# Patient Record
Sex: FEMALE | Race: OTHER | HISPANIC OR LATINO | Employment: PART TIME | URBAN - METROPOLITAN AREA
[De-identification: names, ages, dates, MRNs, and addresses within clinical notes are randomized per-mention and may not be internally consistent; named-entity substitution may affect disease eponyms.]

---

## 2017-01-12 ENCOUNTER — HOSPITAL ENCOUNTER (OUTPATIENT)
Facility: MEDICAL CENTER | Age: 32
End: 2017-01-12
Attending: PHYSICIAN ASSISTANT
Payer: MEDICAID

## 2017-01-12 ENCOUNTER — ROUTINE PRENATAL (OUTPATIENT)
Dept: OBGYN | Facility: CLINIC | Age: 32
End: 2017-01-12
Payer: MEDICAID

## 2017-01-12 VITALS — WEIGHT: 136 LBS | SYSTOLIC BLOOD PRESSURE: 104 MMHG | BODY MASS INDEX: 29.42 KG/M2 | DIASTOLIC BLOOD PRESSURE: 58 MMHG

## 2017-01-12 DIAGNOSIS — Z34.80 ENCOUNTER FOR SUPERVISION OF NORMAL PREGNANCY IN MULTIGRAVIDA, ANTEPARTUM: ICD-10-CM

## 2017-01-12 DIAGNOSIS — Z34.80 ENCOUNTER FOR SUPERVISION OF NORMAL PREGNANCY IN MULTIGRAVIDA, ANTEPARTUM: Primary | ICD-10-CM

## 2017-01-12 PROCEDURE — 90040 PR PRENATAL FOLLOW UP: CPT | Performed by: PHYSICIAN ASSISTANT

## 2017-01-12 PROCEDURE — 87653 STREP B DNA AMP PROBE: CPT

## 2017-01-12 NOTE — PROGRESS NOTES
Pt has no complaints with cramping, UCs, VB, LOF. +FM. GBS done today. Labor precautions reviewed. Daily FKC recommended. Cervix: Cl/75/-2, ballotable, post, firm, vtx. Pt does have cold, so cold remedies sheet given today - call if not improving. RTC 1 wk or sooner prn.

## 2017-01-12 NOTE — PROGRESS NOTES
Pt here today for OB follow up  Pt states no complaints  Reports +FM  WT: 136lb  BP:104/58  Good # [phone]  GBS TODAY

## 2017-01-12 NOTE — MR AVS SNAPSHOT
"Isabel Galarza   2017 3:30 PM   Routine Prenatal   MRN: [id number]    [facility]   Dept Phone:  [phone]    Description:  Female : 1985   Provider:  BLANCA Dahl           Allergies as of 2017     Allergen Noted Reactions    Sulfa Drugs 10/15/2013   Hives    \"when I was in long term\"  \"I looked like a lobster\"      You were diagnosed with     Encounter for supervision of normal pregnancy in multigravida, antepartum   [9493157]  -  Primary       Vital Signs     Blood Pressure Weight Last Menstrual Period Smoking Status          104/58 mmHg 61.689 kg (136 lb) (LMP Unknown) Never Smoker         Basic Information     Date Of Birth Sex Race Ethnicity Preferred Language    1985 Female  or   Origin (Tunisian,Palauan,Azerbaijani,Suhas, etc) English      Problem List              ICD-10-CM Priority Class Noted - Resolved    Encounter for supervision of normal pregnancy in multigravida, antepartum Z34.80   2016 - Present      Health Maintenance        Date Due Completion Dates    IMM DTaP/Tdap/Td Vaccine (1 - Tdap) 2005 ---    IMM INFLUENZA (1) 2016 ---    PAP SMEAR 2019, 2010            Current Immunizations     No immunizations on file.      Below and/or attached are the medications your provider expects you to take. Review all of your home medications and newly ordered medications with your provider and/or pharmacist. Follow medication instructions as directed by your provider and/or pharmacist. Please keep your medication list with you and share with your provider. Update the information when medications are discontinued, doses are changed, or new medications (including over-the-counter products) are added; and carry medication information at all times in the event of emergency situations     Allergies:  SULFA DRUGS - Hives               Medications  Valid as of: 2017 -  3:40 PM    Generic Name " Brand Name Tablet Size Instructions for use    Prenatal MV-Min-Fe Fum-FA-DHA   Take  by mouth.        .                 Medicines prescribed today were sent to:     [facility]/PHARMACY #[id number] - [place], [place] - [address]    [address] [place] [address]    Phone: [phone] Fax: [phone]    Open 24 Hours?: No      Medication refill instructions:       If your prescription bottle indicates you have medication refills left, it is not necessary to call your provider’s office. Please contact your pharmacy and they will refill your medication.    If your prescription bottle indicates you do not have any refills left, you may request refills at any time through one of the following ways: The online ExtraOrtho system (except Urgent Care), by calling your provider’s office, or by asking your pharmacy to contact your provider’s office with a refill request. Medication refills are processed only during regular business hours and may not be available until the next business day. Your provider may request additional information or to have a follow-up visit with you prior to refilling your medication.   *Please Note: Medication refills are assigned a new Rx number when refilled electronically. Your pharmacy may indicate that no refills were authorized even though a new prescription for the same medication is available at the pharmacy. Please request the medicine by name with the pharmacy before contacting your provider for a refill.        Your To Do List     Future Labs/Procedures Complete By Expires    GRP B STREP, BY PCR (GAGE BROTH)  As directed 1/12/2018    Comments:    SOURCE VAGINAL AND RECTAL      Other Notes About Your Plan     GIRL           MyChart Access Code: [email or web address]  Expires: 1/13/2017  4:34 PM    ExtraOrtho  A secure, online tool to manage your health information     [employer]’s ExtraOrtho® is a secure, online tool that connects you to your personalized health information from the  privacy of your home -- day or night - making it very easy for you to manage your healthcare. Once the activation process is completed, you can even access your medical information using the ShareSquare cordell, which is available for free in the Apple Cordell store or Google Play store.     ShareSquare provides the following levels of access (as shown below):   My Chart Features   [facility] Doctor [facility]  Specialists [facility]  Urgent  Care Non-[facility]  Primary Care  Doctor   Email your healthcare team securely and privately 24/7 X X X    Manage appointments: schedule your next appointment; view details of past/upcoming appointments X      Request prescription refills. X      View recent personal medical records, including lab and immunizations X X X X   View health record, including health history, allergies, medications X X X X   Read reports about your outpatient visits, procedures, consult and ER notes X X X X   See your discharge summary, which is a recap of your hospital and/or ER visit that includes your diagnosis, lab results, and care plan. X X       How to register for ShareSquare:  1. Go to  [email or web address].  2. Click on the Sign Up Now box, which takes you to the New Member Sign Up page. You will need to provide the following information:  a. Enter your ShareSquare Access Code exactly as it appears at the top of this page. (You will not need to use this code after you’ve completed the sign-up process. If you do not sign up before the expiration date, you must request a new code.)   b. Enter your date of birth.   c. Enter your home email address.   d. Click Submit, and follow the next screen’s instructions.  3. Create a ShareSquare ID. This will be your ShareSquare login ID and cannot be changed, so think of one that is secure and easy to remember.  4. Create a ShareSquare password. You can change your password at any time.  5. Enter your Password Reset Question and Answer. This can be used at a later time if you forget  your password.   6. Enter your e-mail address. This allows you to receive e-mail notifications when new information is available in Zappli.  7. Click Sign Up. You can now view your health information.    For assistance activating your Zappli account, call [phone]

## 2017-01-13 LAB — GP B STREP DNA SPEC QL NAA+PROBE: NEGATIVE

## 2017-01-23 ENCOUNTER — ROUTINE PRENATAL (OUTPATIENT)
Dept: OBGYN | Facility: CLINIC | Age: 32
End: 2017-01-23
Payer: MEDICAID

## 2017-01-23 VITALS — SYSTOLIC BLOOD PRESSURE: 104 MMHG | BODY MASS INDEX: 29.85 KG/M2 | WEIGHT: 138 LBS | DIASTOLIC BLOOD PRESSURE: 64 MMHG

## 2017-01-23 DIAGNOSIS — Z34.80 ENCOUNTER FOR SUPERVISION OF NORMAL PREGNANCY IN MULTIGRAVIDA, ANTEPARTUM: Primary | ICD-10-CM

## 2017-01-23 PROCEDURE — 90040 PR PRENATAL FOLLOW UP: CPT | Performed by: NURSE PRACTITIONER

## 2017-01-23 NOTE — MR AVS SNAPSHOT
"        Isabel Galarza   2017 3:45 PM   Routine Prenatal   MRN: [id number]    [facility]   Dept Phone:  [phone]    Description:  Female : 1985   Provider:  Ainsley Medrano C.N.M.           Allergies as of 2017     Allergen Noted Reactions    Sulfa Drugs 10/15/2013   Hives    \"when I was in intermediate\"  \"I looked like a lobster\"      You were diagnosed with     Encounter for supervision of normal pregnancy in multigravida, antepartum   [[id number]]  -  Primary       Vital Signs     Blood Pressure Weight Last Menstrual Period Smoking Status          104/64 mmHg 62.596 kg (138 lb) (LMP Unknown) Never Smoker         Basic Information     Date Of Birth Sex Race Ethnicity Preferred Language    1985 Female  or   Origin (Kenyan,Finnish,Venezuelan,Suhas, etc) English      Problem List              ICD-10-CM Priority Class Noted - Resolved    Encounter for supervision of normal pregnancy in multigravida, antepartum Z34.80   2016 - Present      Health Maintenance        Date Due Completion Dates    IMM DTaP/Tdap/Td Vaccine (1 - Tdap) 2005 ---    IMM INFLUENZA (1) 2016 ---    PAP SMEAR 2019, 2010            Current Immunizations     No immunizations on file.      Below and/or attached are the medications your provider expects you to take. Review all of your home medications and newly ordered medications with your provider and/or pharmacist. Follow medication instructions as directed by your provider and/or pharmacist. Please keep your medication list with you and share with your provider. Update the information when medications are discontinued, doses are changed, or new medications (including over-the-counter products) are added; and carry medication information at all times in the event of emergency situations     Allergies:  SULFA DRUGS - Hives               Medications  Valid as of: 2017 -  3:53 PM    Generic " Name Brand Name Tablet Size Instructions for use    Prenatal MV-Min-Fe Fum-FA-DHA   Take  by mouth.        .                 Medicines prescribed today were sent to:     [facility]/PHARMACY #[id number] - [place], [place] - [address]    [address] [place] [address]    Phone: [phone] Fax: [phone]    Open 24 Hours?: No      Medication refill instructions:       If your prescription bottle indicates you have medication refills left, it is not necessary to call your provider’s office. Please contact your pharmacy and they will refill your medication.    If your prescription bottle indicates you do not have any refills left, you may request refills at any time through one of the following ways: The online HeyCrowd system (except Urgent Care), by calling your provider’s office, or by asking your pharmacy to contact your provider’s office with a refill request. Medication refills are processed only during regular business hours and may not be available until the next business day. Your provider may request additional information or to have a follow-up visit with you prior to refilling your medication.   *Please Note: Medication refills are assigned a new Rx number when refilled electronically. Your pharmacy may indicate that no refills were authorized even though a new prescription for the same medication is available at the pharmacy. Please request the medicine by name with the pharmacy before contacting your provider for a refill.        Instructions    P:  1.  Continue FKCs.         2.  Labor precautions given.  Instructions given on where to go.  Pt receptive to education.         3. Reviewed GBS status w pt.       4.  Questions answered.         5.  Encouraged adequate water intake       6.  F/u 1wk       7.  FYI: none.       Other Notes About Your Plan     GIRL           MyChart Status: Patient Declined

## 2017-01-23 NOTE — PATIENT INSTRUCTIONS
P:  1.  Continue FKCs.         2.  Labor precautions given.  Instructions given on where to go.  Pt receptive to education.         3. Reviewed GBS status w pt.       4.  Questions answered.         5.  Encouraged adequate water intake       6.  F/u 1wk       7.  FYI: none.

## 2017-01-23 NOTE — PROGRESS NOTES
Pt here today for OB follow up  Pt states no complaints  Reports +FM  WT:138lb  BP:104/64  Good # [phone]  GBS NEGATIVE

## 2017-01-23 NOTE — PROGRESS NOTES
S:  Pt is  at 37w3d here for routine OB follow up.  Reports an occ UC.  Reports good FM.  Denies VB, LOF, RUCs, or vaginal DC.     O:  Please see above vitals.        FHTs: 155        Fundal ht: 36        Fetal position: vertex        SVE: deferred        GBS neg on 17 -- reviewed w pt.      A:  IUP at 37w3d  Patient Active Problem List    Diagnosis Date Noted   • Encounter for supervision of normal pregnancy in multigravida, antepartum 2016       P:  1.  Continue FKCs.         2.  Labor precautions given.  Instructions given on where to go.  Pt receptive to education.         3. Reviewed GBS status w pt.       4.  Questions answered.         5.  Encouraged adequate water intake       6.  F/u 1wk       7.  FYI: none.

## 2017-02-02 ENCOUNTER — ROUTINE PRENATAL (OUTPATIENT)
Dept: OBGYN | Facility: CLINIC | Age: 32
End: 2017-02-02
Payer: MEDICAID

## 2017-02-02 VITALS — SYSTOLIC BLOOD PRESSURE: 102 MMHG | WEIGHT: 138 LBS | BODY MASS INDEX: 29.85 KG/M2 | DIASTOLIC BLOOD PRESSURE: 58 MMHG

## 2017-02-02 DIAGNOSIS — Z34.80 ENCOUNTER FOR SUPERVISION OF NORMAL PREGNANCY IN MULTIGRAVIDA, ANTEPARTUM: ICD-10-CM

## 2017-02-02 PROCEDURE — 90040 PR PRENATAL FOLLOW UP: CPT | Performed by: NURSE PRACTITIONER

## 2017-02-02 NOTE — MR AVS SNAPSHOT
"        Isabel Galarza   2017 2:30 PM   Routine Prenatal   MRN: [id number]    [facility]   Dept Phone:  [phone]    Description:  Female : 1985   Provider:  Leandra Torres D.N.P.           Allergies as of 2017     Allergen Noted Reactions    Sulfa Drugs 10/15/2013   Hives    \"when I was in residential\"  \"I looked like a lobster\"      You were diagnosed with     Encounter for supervision of normal pregnancy in multigravida, antepartum   [[id number]]         Vital Signs     Blood Pressure Weight Last Menstrual Period Smoking Status          102/58 mmHg 62.596 kg (138 lb) (LMP Unknown) Never Smoker         Basic Information     Date Of Birth Sex Race Ethnicity Preferred Language    1985 Female  or   Origin (Moldovan,Botswanan,Malawian,Suhas, etc) English      Your appointments     2017  3:45 PM   OB Follow Up with Leandra Torres D.N.P.   [facility] ([address] [address] [phone]              Problem List              ICD-10-CM Priority Class Noted - Resolved    Encounter for supervision of normal pregnancy in multigravida, antepartum Z34.80   2016 - Present      Health Maintenance        Date Due Completion Dates    IMM DTaP/Tdap/Td Vaccine (1 - Tdap) 2005 ---    IMM INFLUENZA (1) 2016 ---    PAP SMEAR 2019, 2010            Current Immunizations     No immunizations on file.      Below and/or attached are the medications your provider expects you to take. Review all of your home medications and newly ordered medications with your provider and/or pharmacist. Follow medication instructions as directed by your provider and/or pharmacist. Please keep your medication list with you and share with your provider. Update the information when medications are discontinued, doses are changed, or new medications (including over-the-counter products) are added; and carry medication " information at all times in the event of emergency situations     Allergies:  SULFA DRUGS - Hives               Medications  Valid as of: February 02, 2017 -  4:50 PM    Generic Name Brand Name Tablet Size Instructions for use    Prenatal MV-Min-Fe Fum-FA-DHA   Take  by mouth.        .                 Medicines prescribed today were sent to:     [facility]/PHARMACY #[id number] - [place], [place] - [address]    [address] [place] [address]    Phone: [phone] Fax: [phone]    Open 24 Hours?: No      Medication refill instructions:       If your prescription bottle indicates you have medication refills left, it is not necessary to call your provider’s office. Please contact your pharmacy and they will refill your medication.    If your prescription bottle indicates you do not have any refills left, you may request refills at any time through one of the following ways: The online OmniPV system (except Urgent Care), by calling your provider’s office, or by asking your pharmacy to contact your provider’s office with a refill request. Medication refills are processed only during regular business hours and may not be available until the next business day. Your provider may request additional information or to have a follow-up visit with you prior to refilling your medication.   *Please Note: Medication refills are assigned a new Rx number when refilled electronically. Your pharmacy may indicate that no refills were authorized even though a new prescription for the same medication is available at the pharmacy. Please request the medicine by name with the pharmacy before contacting your provider for a refill.        Your To Do List     Future Labs/Procedures Complete By Expires    INDUCTION OF LABOR  As directed 2/2/2018      Other Notes About Your Plan     GIRL           [email or web address] Status: Patient Declined

## 2017-02-02 NOTE — PROGRESS NOTES
OB f/u   + fetal movement.  No VB, LOF   Good phone # [phone]  Preferred pharmacy confirmed.  GBS negative  Pt c/o irregular UCs

## 2017-02-02 NOTE — PROGRESS NOTES
"S) Pt is a 31 y.o.   at 38w6d  gestation. Routine prenatal care today. States occasional irregular UC's. No other reported issues.  Reports good  fetal movement. Denies bleeding or leaking of fluid. Denies dysuria. Generally feels well today. Good self-care activities identified. Denies headaches.     O) see flow         Filed Vitals:    17 1439   BP: 102/58   Weight: 62.596 kg (138 lb)           Lab: normal prenatal panel, normal glucose Neg GBS       Pertinent ultrasound - Normal fetal survey           A) IUP at 38w6d       S=D         Patient Active Problem List    Diagnosis Date Noted   • Encounter for supervision of normal pregnancy in multigravida, antepartum 2016       P) s/s labor vs general discomforts. Fetal movements reviewed. General ed and anticipatory guidance. Nutrition/exercise/vitamin. Plans breast.  Plans pp contraception- \"I have not thought about it\". Continue PNV.     "

## 2017-02-07 ENCOUNTER — ROUTINE PRENATAL (OUTPATIENT)
Dept: OBGYN | Facility: CLINIC | Age: 32
End: 2017-02-07
Payer: MEDICAID

## 2017-02-07 VITALS — BODY MASS INDEX: 29.64 KG/M2 | WEIGHT: 137 LBS | SYSTOLIC BLOOD PRESSURE: 118 MMHG | DIASTOLIC BLOOD PRESSURE: 60 MMHG

## 2017-02-07 DIAGNOSIS — Z34.80 ENCOUNTER FOR SUPERVISION OF NORMAL PREGNANCY IN MULTIGRAVIDA, ANTEPARTUM: ICD-10-CM

## 2017-02-07 PROCEDURE — 90040 PR PRENATAL FOLLOW UP: CPT | Performed by: NURSE PRACTITIONER

## 2017-02-07 NOTE — PROGRESS NOTES
"Pt here today for OB follow up  Reports +FM   C/o \"very little contractions\"  WT:137lb  BP:118/60  Good # [phone]    "

## 2017-02-07 NOTE — MR AVS SNAPSHOT
"        Isabel Galarza   2017 3:45 PM   Routine Prenatal   MRN: [id number]    [facility]   Dept Phone:  [phone]    Description:  Female : 1985   Provider:  Leandra Torres D.N.P.           Allergies as of 2017     Allergen Noted Reactions    Sulfa Drugs 10/15/2013   Hives    \"when I was in intermediate\"  \"I looked like a lobster\"      You were diagnosed with     Encounter for supervision of normal pregnancy in multigravida, antepartum   [[id number]]         Vital Signs     Blood Pressure Weight Last Menstrual Period Smoking Status          118/60 mmHg 62.143 kg (137 lb) (LMP Unknown) Never Smoker         Basic Information     Date Of Birth Sex Race Ethnicity Preferred Language    1985 Female  or   Origin (Swiss,Brazilian,Tajik,Suhas, etc) English      Your appointments     2017 10:00 AM   [facility] INDUCTION OF LABOR with NON-SURGICAL L&D   LABOR & DELIVERY [facility] (--)    [address] [place] [id number]   [phone]              Problem List              ICD-10-CM Priority Class Noted - Resolved    Encounter for supervision of normal pregnancy in multigravida, antepartum Z34.80   2016 - Present      Health Maintenance        Date Due Completion Dates    IMM DTaP/Tdap/Td Vaccine (1 - Tdap) 2005 ---    IMM INFLUENZA (1) 2016 ---    PAP SMEAR 2019, 2010            Current Immunizations     No immunizations on file.      Below and/or attached are the medications your provider expects you to take. Review all of your home medications and newly ordered medications with your provider and/or pharmacist. Follow medication instructions as directed by your provider and/or pharmacist. Please keep your medication list with you and share with your provider. Update the information when medications are discontinued, doses are changed, or new medications (including over-the-counter products) are added; and carry medication " information at all times in the event of emergency situations     Allergies:  SULFA DRUGS - Hives               Medications  Valid as of: February 07, 2017 -  3:51 PM    Generic Name Brand Name Tablet Size Instructions for use    Prenatal MV-Min-Fe Fum-FA-DHA   Take  by mouth.        .                 Medicines prescribed today were sent to:     [facility]/PHARMACY #[id number] - [place], [place] - [address]    [address] [place] [address]    Phone: [phone] Fax: [phone]    Open 24 Hours?: No      Medication refill instructions:       If your prescription bottle indicates you have medication refills left, it is not necessary to call your provider’s office. Please contact your pharmacy and they will refill your medication.    If your prescription bottle indicates you do not have any refills left, you may request refills at any time through one of the following ways: The online BioCee system (except Urgent Care), by calling your provider’s office, or by asking your pharmacy to contact your provider’s office with a refill request. Medication refills are processed only during regular business hours and may not be available until the next business day. Your provider may request additional information or to have a follow-up visit with you prior to refilling your medication.   *Please Note: Medication refills are assigned a new Rx number when refilled electronically. Your pharmacy may indicate that no refills were authorized even though a new prescription for the same medication is available at the pharmacy. Please request the medicine by name with the pharmacy before contacting your provider for a refill.        Other Notes About Your Plan     GIRL           [employer]hart Status: Patient Declined

## 2017-02-07 NOTE — PROGRESS NOTES
S) Pt is a 31 y.o.   at 39w4d  gestation. Routine prenatal care today. States no specific problems today.  Reports good  fetal movement. Denies progressive cramping. No bleeding or leaking of fluid. Denies dysuria. Generally feels well today. Good self-care activities identified. Denies headaches.     O) see flow         Filed Vitals:    17 1538   BP: 118/60   Weight: 62.143 kg (137 lb)           Lab: normal prenatal panel, normal glucose, Neg GBS       Pertinent ultrasound - Normal fetal survey            A) IUP at 39w4d       S=D         Patient Active Problem List    Diagnosis Date Noted   • Encounter for supervision of normal pregnancy in multigravida, antepartum 2016       P) s/s labor vs general discomforts. Fetal movements reviewed. General ed and anticipatory guidance. Nutrition/exercise/vitamin. Plans breast. Continue PNV. IOL instructions explained. Return to care 1 week.

## 2017-02-08 ENCOUNTER — HOSPITAL ENCOUNTER (INPATIENT)
Facility: MEDICAL CENTER | Age: 32
LOS: 1 days | End: 2017-02-09
Attending: OBSTETRICS & GYNECOLOGY | Admitting: OBSTETRICS & GYNECOLOGY
Payer: MEDICAID

## 2017-02-08 LAB
BASOPHILS # BLD AUTO: 0.6 % (ref 0–1.8)
BASOPHILS # BLD: 0.07 K/UL (ref 0–0.12)
EOSINOPHIL # BLD AUTO: 0.16 K/UL (ref 0–0.51)
EOSINOPHIL NFR BLD: 1.5 % (ref 0–6.9)
ERYTHROCYTE [DISTWIDTH] IN BLOOD BY AUTOMATED COUNT: 45.8 FL (ref 35.9–50)
ERYTHROCYTE [DISTWIDTH] IN BLOOD BY AUTOMATED COUNT: 45.9 FL (ref 35.9–50)
HCT VFR BLD AUTO: 37.9 % (ref 37–47)
HCT VFR BLD AUTO: 38.8 % (ref 37–47)
HGB BLD-MCNC: 12.9 G/DL (ref 12–16)
HGB BLD-MCNC: 13.5 G/DL (ref 12–16)
HOLDING TUBE BB 8507: NORMAL
IMM GRANULOCYTES # BLD AUTO: 0.05 K/UL (ref 0–0.11)
IMM GRANULOCYTES NFR BLD AUTO: 0.5 % (ref 0–0.9)
LYMPHOCYTES # BLD AUTO: 2.75 K/UL (ref 1–4.8)
LYMPHOCYTES NFR BLD: 25 % (ref 22–41)
MCH RBC QN AUTO: 31.5 PG (ref 27–33)
MCH RBC QN AUTO: 31.8 PG (ref 27–33)
MCHC RBC AUTO-ENTMCNC: 34 G/DL (ref 33.6–35)
MCHC RBC AUTO-ENTMCNC: 34.8 G/DL (ref 33.6–35)
MCV RBC AUTO: 91.5 FL (ref 81.4–97.8)
MCV RBC AUTO: 92.7 FL (ref 81.4–97.8)
MONOCYTES # BLD AUTO: 0.54 K/UL (ref 0–0.85)
MONOCYTES NFR BLD AUTO: 4.9 % (ref 0–13.4)
NEUTROPHILS # BLD AUTO: 7.44 K/UL (ref 2–7.15)
NEUTROPHILS NFR BLD: 67.5 % (ref 44–72)
NRBC # BLD AUTO: 0 K/UL
NRBC BLD AUTO-RTO: 0 /100 WBC
PLATELET # BLD AUTO: 191 K/UL (ref 164–446)
PLATELET # BLD AUTO: 222 K/UL (ref 164–446)
PMV BLD AUTO: 11.1 FL (ref 9–12.9)
PMV BLD AUTO: 11.2 FL (ref 9–12.9)
RBC # BLD AUTO: 4.09 M/UL (ref 4.2–5.4)
RBC # BLD AUTO: 4.24 M/UL (ref 4.2–5.4)
WBC # BLD AUTO: 11 K/UL (ref 4.8–10.8)
WBC # BLD AUTO: 20 K/UL (ref 4.8–10.8)

## 2017-02-08 PROCEDURE — 36415 COLL VENOUS BLD VENIPUNCTURE: CPT

## 2017-02-08 PROCEDURE — 700102 HCHG RX REV CODE 250 W/ 637 OVERRIDE(OP): Performed by: NURSE PRACTITIONER

## 2017-02-08 PROCEDURE — 304965 HCHG RECOVERY SERVICES

## 2017-02-08 PROCEDURE — 303615 HCHG EPIDURAL/SPINAL ANESTHESIA FOR LABOR

## 2017-02-08 PROCEDURE — 770002 HCHG ROOM/CARE - OB PRIVATE (112)

## 2017-02-08 PROCEDURE — 700111 HCHG RX REV CODE 636 W/ 250 OVERRIDE (IP)

## 2017-02-08 PROCEDURE — A9270 NON-COVERED ITEM OR SERVICE: HCPCS | Performed by: NURSE PRACTITIONER

## 2017-02-08 PROCEDURE — 700111 HCHG RX REV CODE 636 W/ 250 OVERRIDE (IP): Performed by: NURSE PRACTITIONER

## 2017-02-08 PROCEDURE — 85027 COMPLETE CBC AUTOMATED: CPT

## 2017-02-08 PROCEDURE — 59409 OBSTETRICAL CARE: CPT

## 2017-02-08 PROCEDURE — 85025 COMPLETE CBC W/AUTO DIFF WBC: CPT

## 2017-02-08 PROCEDURE — 700111 HCHG RX REV CODE 636 W/ 250 OVERRIDE (IP): Performed by: OBSTETRICS & GYNECOLOGY

## 2017-02-08 RX ORDER — SODIUM CHLORIDE, SODIUM LACTATE, POTASSIUM CHLORIDE, CALCIUM CHLORIDE 600; 310; 30; 20 MG/100ML; MG/100ML; MG/100ML; MG/100ML
INJECTION, SOLUTION INTRAVENOUS CONTINUOUS
Status: DISPENSED | OUTPATIENT
Start: 2017-02-08 | End: 2017-02-08

## 2017-02-08 RX ORDER — OXYTOCIN 10 [USP'U]/ML
10 INJECTION, SOLUTION INTRAMUSCULAR; INTRAVENOUS
Status: DISCONTINUED | OUTPATIENT
Start: 2017-02-08 | End: 2017-02-08 | Stop reason: HOSPADM

## 2017-02-08 RX ORDER — ONDANSETRON 4 MG/1
4 TABLET, ORALLY DISINTEGRATING ORAL EVERY 6 HOURS PRN
Status: DISCONTINUED | OUTPATIENT
Start: 2017-02-08 | End: 2017-02-09 | Stop reason: HOSPADM

## 2017-02-08 RX ORDER — ROPIVACAINE HYDROCHLORIDE 2 MG/ML
INJECTION, SOLUTION EPIDURAL; INFILTRATION; PERINEURAL
Status: COMPLETED
Start: 2017-02-08 | End: 2017-02-08

## 2017-02-08 RX ORDER — DOCUSATE SODIUM 100 MG/1
100 CAPSULE, LIQUID FILLED ORAL 2 TIMES DAILY PRN
Status: DISCONTINUED | OUTPATIENT
Start: 2017-02-08 | End: 2017-02-09 | Stop reason: HOSPADM

## 2017-02-08 RX ORDER — MISOPROSTOL 200 UG/1
800 TABLET ORAL
Status: COMPLETED | OUTPATIENT
Start: 2017-02-08 | End: 2017-02-08

## 2017-02-08 RX ORDER — METOCLOPRAMIDE HYDROCHLORIDE 5 MG/ML
10 INJECTION INTRAMUSCULAR; INTRAVENOUS EVERY 6 HOURS PRN
Status: DISCONTINUED | OUTPATIENT
Start: 2017-02-08 | End: 2017-02-09 | Stop reason: HOSPADM

## 2017-02-08 RX ORDER — METHYLERGONOVINE MALEATE 0.2 MG/ML
0.2 INJECTION INTRAVENOUS
Status: COMPLETED | OUTPATIENT
Start: 2017-02-08 | End: 2017-02-08

## 2017-02-08 RX ORDER — HYDROCODONE BITARTRATE AND ACETAMINOPHEN 5; 325 MG/1; MG/1
1 TABLET ORAL EVERY 4 HOURS PRN
Status: DISCONTINUED | OUTPATIENT
Start: 2017-02-08 | End: 2017-02-09 | Stop reason: HOSPADM

## 2017-02-08 RX ORDER — TERBUTALINE SULFATE 1 MG/ML
0.25 INJECTION, SOLUTION SUBCUTANEOUS PRN
Status: DISCONTINUED | OUTPATIENT
Start: 2017-02-08 | End: 2017-02-08 | Stop reason: HOSPADM

## 2017-02-08 RX ORDER — MISOPROSTOL 200 UG/1
800 TABLET ORAL
Status: DISCONTINUED | OUTPATIENT
Start: 2017-02-08 | End: 2017-02-09 | Stop reason: HOSPADM

## 2017-02-08 RX ORDER — VITAMIN A ACETATE, BETA CAROTENE, ASCORBIC ACID, CHOLECALCIFEROL, .ALPHA.-TOCOPHEROL ACETATE, DL-, THIAMINE MONONITRATE, RIBOFLAVIN, NIACINAMIDE, PYRIDOXINE HYDROCHLORIDE, FOLIC ACID, CYANOCOBALAMIN, CALCIUM CARBONATE, FERROUS FUMARATE, ZINC OXIDE, CUPRIC OXIDE 3080; 12; 120; 400; 1; 1.84; 3; 20; 22; 920; 25; 200; 27; 10; 2 [IU]/1; UG/1; MG/1; [IU]/1; MG/1; MG/1; MG/1; MG/1; MG/1; [IU]/1; MG/1; MG/1; MG/1; MG/1; MG/1
1 TABLET, FILM COATED ORAL EVERY MORNING
Status: DISCONTINUED | OUTPATIENT
Start: 2017-02-08 | End: 2017-02-09 | Stop reason: HOSPADM

## 2017-02-08 RX ORDER — ALUMINA, MAGNESIA, AND SIMETHICONE 2400; 2400; 240 MG/30ML; MG/30ML; MG/30ML
30 SUSPENSION ORAL EVERY 6 HOURS PRN
Status: DISCONTINUED | OUTPATIENT
Start: 2017-02-08 | End: 2017-02-08 | Stop reason: HOSPADM

## 2017-02-08 RX ORDER — SODIUM CHLORIDE, SODIUM LACTATE, POTASSIUM CHLORIDE, CALCIUM CHLORIDE 600; 310; 30; 20 MG/100ML; MG/100ML; MG/100ML; MG/100ML
INJECTION, SOLUTION INTRAVENOUS PRN
Status: DISCONTINUED | OUTPATIENT
Start: 2017-02-08 | End: 2017-02-09 | Stop reason: HOSPADM

## 2017-02-08 RX ORDER — IBUPROFEN 600 MG/1
600 TABLET ORAL EVERY 6 HOURS PRN
Status: DISCONTINUED | OUTPATIENT
Start: 2017-02-08 | End: 2017-02-09 | Stop reason: HOSPADM

## 2017-02-08 RX ORDER — HYDROCODONE BITARTRATE AND ACETAMINOPHEN 10; 325 MG/1; MG/1
1 TABLET ORAL EVERY 4 HOURS PRN
Status: DISCONTINUED | OUTPATIENT
Start: 2017-02-08 | End: 2017-02-09 | Stop reason: HOSPADM

## 2017-02-08 RX ORDER — CARBOPROST TROMETHAMINE 250 UG/ML
250 INJECTION, SOLUTION INTRAMUSCULAR
Status: DISCONTINUED | OUTPATIENT
Start: 2017-02-08 | End: 2017-02-08 | Stop reason: HOSPADM

## 2017-02-08 RX ORDER — ACETAMINOPHEN 325 MG/1
325 TABLET ORAL EVERY 4 HOURS PRN
Status: DISCONTINUED | OUTPATIENT
Start: 2017-02-08 | End: 2017-02-09 | Stop reason: HOSPADM

## 2017-02-08 RX ORDER — ONDANSETRON 2 MG/ML
4 INJECTION INTRAMUSCULAR; INTRAVENOUS EVERY 6 HOURS PRN
Status: DISCONTINUED | OUTPATIENT
Start: 2017-02-08 | End: 2017-02-09 | Stop reason: HOSPADM

## 2017-02-08 RX ORDER — DEXTROSE, SODIUM CHLORIDE, SODIUM LACTATE, POTASSIUM CHLORIDE, AND CALCIUM CHLORIDE 5; .6; .31; .03; .02 G/100ML; G/100ML; G/100ML; G/100ML; G/100ML
INJECTION, SOLUTION INTRAVENOUS CONTINUOUS
Status: DISCONTINUED | OUTPATIENT
Start: 2017-02-08 | End: 2017-02-08 | Stop reason: HOSPADM

## 2017-02-08 RX ORDER — SODIUM CHLORIDE, SODIUM LACTATE, POTASSIUM CHLORIDE, CALCIUM CHLORIDE 600; 310; 30; 20 MG/100ML; MG/100ML; MG/100ML; MG/100ML
INJECTION, SOLUTION INTRAVENOUS
Status: ACTIVE
Start: 2017-02-08 | End: 2017-02-08

## 2017-02-08 RX ADMIN — Medication 1 MILLI-UNITS/MIN: at 05:30

## 2017-02-08 RX ADMIN — Medication 2000 ML/HR: at 14:40

## 2017-02-08 RX ADMIN — SODIUM CHLORIDE, POTASSIUM CHLORIDE, SODIUM LACTATE AND CALCIUM CHLORIDE: 600; 310; 30; 20 INJECTION, SOLUTION INTRAVENOUS at 11:44

## 2017-02-08 RX ADMIN — IBUPROFEN 600 MG: 600 TABLET, FILM COATED ORAL at 17:40

## 2017-02-08 RX ADMIN — SODIUM CHLORIDE, POTASSIUM CHLORIDE, SODIUM LACTATE AND CALCIUM CHLORIDE: 600; 310; 30; 20 INJECTION, SOLUTION INTRAVENOUS at 05:30

## 2017-02-08 RX ADMIN — IBUPROFEN 600 MG: 600 TABLET, FILM COATED ORAL at 23:18

## 2017-02-08 RX ADMIN — Medication 125 ML/HR: at 16:06

## 2017-02-08 RX ADMIN — ALUMINUM HYDROXIDE, MAGNESIUM HYDROXIDE,SIMETHICONE 30 ML: 400; 400; 40 LIQUID ORAL at 07:51

## 2017-02-08 RX ADMIN — ROPIVACAINE HYDROCHLORIDE 200 MG: 2 INJECTION, SOLUTION EPIDURAL; INFILTRATION at 07:31

## 2017-02-08 RX ADMIN — SODIUM CHLORIDE, POTASSIUM CHLORIDE, SODIUM LACTATE AND CALCIUM CHLORIDE: 600; 310; 30; 20 INJECTION, SOLUTION INTRAVENOUS at 07:10

## 2017-02-08 ASSESSMENT — PAIN SCALES - GENERAL
PAINLEVEL_OUTOF10: 3
PAINLEVEL_OUTOF10: 2
PAINLEVEL_OUTOF10: 5
PAINLEVEL_OUTOF10: 0
PAINLEVEL_OUTOF10: 3
PAINLEVEL_OUTOF10: 0

## 2017-02-08 ASSESSMENT — PATIENT HEALTH QUESTIONNAIRE - PHQ9
1. LITTLE INTEREST OR PLEASURE IN DOING THINGS: NOT AT ALL
2. FEELING DOWN, DEPRESSED, IRRITABLE, OR HOPELESS: NOT AT ALL
SUM OF ALL RESPONSES TO PHQ QUESTIONS 1-9: 0
SUM OF ALL RESPONSES TO PHQ9 QUESTIONS 1 AND 2: 0

## 2017-02-08 ASSESSMENT — COPD QUESTIONNAIRES
HAVE YOU SMOKED AT LEAST 100 CIGARETTES IN YOUR ENTIRE LIFE: NO/DON'T KNOW
DURING THE PAST 4 WEEKS HOW MUCH DID YOU FEEL SHORT OF BREATH: NONE/LITTLE OF THE TIME
COPD SCREENING SCORE: 0
DO YOU EVER COUGH UP ANY MUCUS OR PHLEGM?: NO/ONLY WITH OCCASIONAL COLDS OR INFECTIONS

## 2017-02-08 ASSESSMENT — LIFESTYLE VARIABLES
ALCOHOL_USE: NO
EVER_SMOKED: NEVER
DO YOU DRINK ALCOHOL: NO

## 2017-02-08 NOTE — PROGRESS NOTES
0430- 30 y/o, , EDC 2/10/17, EGA 39.5 . Here to room 216 with PEARL Rousseau. C/o lof. EFM/toco applied, patient grossly ruptured with clear fluid at 0400. Denies bleeding, reports positive fm. VSS. SVE as noted. Updates to JUAN RAMON Medrano. Admission orders received. Patient comfortable with occasional UCs. Pitocin orders received.    0530- Patient reports that she will not accept blood products from anyone even in the case of an emergency. RN discussed patient's increased risk for hemorrhage given her parity and discussed all blood products and had patient sign consent for bloodless program. Bloodless armband on patient's arm and on chart. Provider aware. Pitocin started. Patient educated.     0645- Patient requesting epidural.     0700- Report given to KEYANA Matos.

## 2017-02-08 NOTE — IP AVS SNAPSHOT
Home Care Instructions                                                                                                                Isabel Galarza   MRN: [id number]    Department:  POST PARTUM 31   2017           Your appointments     Mar 27, 2017 11:00 AM   Post Partum with PATRIA Morgan   [facility] ([facility])    [address] [place] [address] [address]  [place] [address] [phone]              Follow-up Information     1. Follow up with Pregnancy RAYA Correa In 5 weeks.    Specialty:  OB/Gyn    Why:  OB check     Contact information    [address] [facility]  [address]  [facility] [address]  [phone]         I assume responsibility for securing a follow-up [place] Screening blood test on my baby within the specified date range.    -                  Discharge Instructions       POSTPARTUM DISCHARGE INSTRUCTIONS FOR MOM    YOB: 1985   Age: 31 y.o.               Admit Date: 2017     Discharge Date: 2017  Attending Doctor:  Toribio Hutchison M.D.                  Allergies:  Bloodless and Sulfa drugs    Discharged to home by car. Discharged via wheelchair, hospital escort: Yes.  Special equipment needed: Not Applicable  Belongings with: Personal  Be sure to schedule a follow-up appointment with your primary care doctor or any specialists as instructed.     Discharge Plan:   Influenza Vaccine Indication: Patient Refuses    REASONS TO CALL YOUR OBSTETRICIAN:  1.   Persistent fever or shaking chills (Temperature higher than 100.4)  2.   Heavy bleeding (soaking more than 1 pad per hour); Passing clots  3.   Foul odor from vagina  4.   Mastitis (Breast infection; breast pain, chills, fever, redness)  5.   Urinary pain, burning or frequency  6.   Episiotomy infection  7.   Abdominal incision infection  8.   Severe depression longer than 24 hours    HAND WASHING  · Prior to handling the baby.  · Before breastfeeding or bottle feeding baby.  · After using the bathroom or changing the baby's  "diaper.    WOUND CARE  Ask your physician for additional care instructions.  In general:    ·  Incision:      · Keep clean and dry.    · Do NOT lift anything heavier than your baby for up to 6 weeks.    · There should not be any opening or pus.      VAGINAL CARE  · Nothing inside vagina for 6 weeks: no sexual intercourse, tampons or douching.  · Bleeding may continue for 2-4 weeks.  Amount may vary.    · Call your physician for heavy bleeding which means soaking more than 1 pad per hour    BIRTH CONTROL  · It is possible to become pregnant at any time after delivery and while breastfeeding.  · Plan to discuss a method of birth control with your physician at your follow up visit. visit.    DIET AND ELIMINATION  · Eating more fiber (bran cereal, fruits, and vegetables) and drinking plenty of fluids will help to avoid constipation.  · Urinary frequency after childbirth is normal.    POSTPARTUM BLUES  During the first few days after birth, you may experience a sense of the \"blues\" which may include impatience, irritability or even crying.  These feeling come and go quickly.  However, as many as 1 in 10 women experience emotional symptoms known as postpartum depression.    Postpartum depression:  May start as early as the second or third day after delivery or take several weeks or months to develop.  Symptoms of \"blues\" are present, but are more intense:  Crying spells; loss of appetite; feelings of hopelessness or loss of control; fear of touching the baby; over concern or no concern at all about the baby; little or no concern about your own appearance/caring for yourself; and/or inability to sleep or excessive sleeping.  Contact your physician if you are experiencing any of these symptoms.    Crisis Hotline:  · [place] Crisis Hotline:  2-462-VRIAWZS  Or 1-793.263.9220  · [place] Crisis Hotline:  1-200.629.2655  Or 985-257-7771    PREVENTING SHAKEN BABY:  If you are angry or stressed, PUT THE BABY IN THE CRIB, " "step away, take some deep breaths, and wait until you are calm to care for the baby.  DO NOT SHAKE THE BABY.  You are not alone, call a supporter for help.    · Crisis Call Center 24/7 crisis line 089-966-3750 or 1-954.337.3886  · You can also text them, text \"ANSWER\" to 366559    QUIT SMOKING/TOBACCO USE:  I understand the use of any tobacco products increases my chance of suffering from future heart disease and could cause other illnesses which may shorten my life. Quitting the use of tobacco products is the single most important thing I can do to improve my health. For further information on smoking / tobacco cessation call a Toll Free Quit Line at 1-909.755.2405 (*National Cancer [place]) or 1-806.883.2419 (American Lung Association) or you can access the web based program at www.lungusa.org.    · [place] Tobacco Users Help Line:  (804) 232-8060       Toll Free: 1-107.503.9397  · Quit Tobacco Program [facility] (811)509-2386    DEPRESSION / SUICIDE RISK:  As you are discharged from this [facility], it is important to learn how to keep safe from harming yourself.    Recognize the warning signs:  · Abrupt changes in personality, positive or negative- including increase in energy   · Giving away possessions  · Change in eating patterns- significant weight changes-  positive or negative  · Change in sleeping patterns- unable to sleep or sleeping all the time   · Unwillingness or inability to communicate  · Depression  · Unusual sadness, discouragement and loneliness  · Talk of wanting to die  · Neglect of personal appearance   · Rebelliousness- reckless behavior  · Withdrawal from people/activities they love  · Confusion- inability to concentrate     If you or a loved one observes any of these behaviors or has concerns about self-harm, here's what you can do:  · Talk about it- your feelings and reasons for harming yourself  · Remove any means that you might use to hurt yourself " (examples: pills, rope, extension cords, firearm)  · Get professional help from the community (Mental Health, Substance Abuse, psychological counseling)  · Do not be alone:Call your Safe Contact- someone whom you trust who will be there for you.  · Call your local CRISIS HOTLINE 907-3964 or 204-654-4978  · Call your local Children's Mobile Crisis Response Team [place] (456) 548-5297 or www.[employer]  · Call the toll free National Suicide Prevention Hotlines   · National Suicide Prevention Lifeline 895-929-UQYD (8353)  · National Hope Line Network 800-SUICIDE (363-1008)    DISCHARGE SURVEY:  Thank you for choosing [facility].  We hope we provided you with very good care.  You may be receiving a survey in the mail.  Please fill it out.  Your opinion is valuable to us.    ADDITIONAL EDUCATIONAL MATERIALS GIVEN TO PATIENT:        My signature on this form indicates that:  1.  I have reviewed and understand the above information  2.  My questions regarding this information have been answered to my satisfaction.  3.  I have formulated a plan with my discharge nurse to obtain my prescribed medication for home.         Discharge Medication Instructions:    Below are the medications your physician expects you to take upon discharge:    Review all your home medications and newly ordered medications with your doctor and/or pharmacist. Follow medication instructions as directed by your doctor and/or pharmacist.    Please keep your medication list with you and share with your physician.               Medication List      START taking these medications        Instructions    ibuprofen 600 MG Tabs   Last time this was given:  600 mg on 2/9/2017  5:33 AM   Commonly known as:  MOTRIN    Take 1 Tab by mouth every 6 hours as needed (for cramping after delivery).   Dose:  600 mg         CONTINUE taking these medications        Instructions    PRENATAL 1 PO    Take  by mouth.               Crisis Hotline:     National  Crisis Hotline:  0-115-VJHLZUC or 1-482.713.1855    [place] Crisis Hotline:    1-388.207.3919 or 121-631-4499        Disclaimer           _____________________________________                     __________       ________       Patient/Mother Signature or Legal                          Date                   Time

## 2017-02-08 NOTE — H&P
History and Physical      Isabel Galarza is a 31 y.o. year old female  at 39w5d who presents for LOF - clear @ 0400.     Subjective:   positive  For CTXS, occasional  negative Feels pain   positive for LOF  negative for vaginal bleeding.   positive for fetal movement    ROS: A comprehensive review of systems was negative.    Past Medical History   Diagnosis Date   • Ear infection      FREQ     Past Surgical History   Procedure Laterality Date   • Tube & ectopic preg., removal     • Gyn surgery       OB History    Para Term  AB SAB TAB Ectopic Multiple Living   9 5 5  3 2  1  5      # Outcome Date GA Lbr Julian/2nd Weight Sex Delivery Anes PTL Lv   9 Current            8 2014     SAB         Comments: Baby passed on its own.   7 2013         Comments: Baby passed on  it own   6 Term 11/23/10 40w0d   M Vag-Spont EPI N Y   5 Term 08/10/09 39w1d 08:00 3.26 kg (7 lb 3 oz) F Vag-Spont None N Y   4 Term 10/23/07 40w0d 08:00  M Vag-Spont None N Y   3 Term 06 38w2d 10:00  F Vag-Spont None N Y   2 Ectopic 2005 5w0d          1 Term 04/10/02 40w0d 15:00  F Vag-Spont None N Y        Social History     Social History   • Marital Status: Single     Spouse Name: N/A   • Number of Children: N/A   • Years of Education: N/A     Occupational History   • Not on file.     Social History Main Topics   • Smoking status: Never Smoker    • Smokeless tobacco: Never Used   • Alcohol Use: No      Comment: socially   • Drug Use: Yes     Special: Marijuana      Comment: Not during pregnancy   • Sexual Activity:     Partners: Male      Comment: no birth control     Other Topics Concern   • Not on file     Social History Narrative     Allergies: Bloodless and Sulfa drugs    Current facility-administered medications:   •  LACTATED RINGERS IV SOLN, , , ,   •  LR infusion, , Intravenous, Continuous, Ainsley Medrano, C.N.M., Last Rate: 125 mL/hr at 17 0530  •  D5LR infusion, , Intravenous,  "Continuous, RAEGAN Morse.N.M.  •  fentaNYL (SUBLIMAZE) injection 50 mcg, 50 mcg, Intravenous, Q HOUR PRN, Ainsley Medrano, C.N.M.  •  fentaNYL (SUBLIMAZE) injection 100 mcg, 100 mcg, Intravenous, Q HOUR PRN, Ainsley Medrano, C.N.M.  •  terbutaline (BRETHINE) injection 0.25 mg, 0.25 mg, Intravenous, PRN, Ainsley Medrano, C.N.M.  •  mag hydrox-al hydrox-simeth (MAALOX PLUS ES or MYLANTA DS) suspension 30 mL, 30 mL, Oral, Q6HRS PRN, Ainsley Medrano, C.N.M.  •  citric acid-sodium citrate (BICITRA) 334-500 MG/5ML solution 30 mL, 30 mL, Oral, Q6HRS PRN, Ainsley Medrano, C.N.M.  •  oxytocin (PITOCIN) injection 10 Units, 10 Units, Intramuscular, Once PRN, Ainsley Medrano, RAEGAN.N.M.  •  misoprostol (CYTOTEC) tablet 800 mcg, 800 mcg, Rectal, Once PRN, Ainsley Medrano, C.N.M.  •  methylergonovine (METHERGINE) injection 0.2 mg, 0.2 mg, Intramuscular, Once PRN, Ainsley Medrano, C.N.M.  •  carboPROST (HEMABATE) injection 250 mcg, 250 mcg, Intramuscular, Once PRN, Ainsley Medrano, C.N.M.  •  oxytocin (PITOCIN) 20 UNITS/1000ML LR, , , ,   •  oxytocin (PITOCIN) 20 UNITS/1000ML LR (induction of labor), 0.5-20 edy-units/min, Intravenous, Continuous, Ainsley Medrano, C.N.M., Last Rate: 9 mL/hr at 02/08/17 0625, 3 edy-units/min at 02/08/17 0625    Prenatal care with [facility] starting at 16wks with following problems:  Patient Active Problem List    Diagnosis Date Noted   • Encounter for supervision of normal pregnancy in multigravida, antepartum 09/16/2016         Objective:      Blood pressure 126/74, pulse 82, temperature 36.2 °C (97.2 °F), temperature source Temporal, resp. rate 16, height 1.448 m (4' 9\"), weight 62.143 kg (137 lb).    General:   no acute distress, alert, cooperative   Skin:   normal   HEENT:  PERRLA   Lungs:   CTA bilateral   Heart:   S1, S2 normal, no murmur, click, rub or gallop, regular rate and rhythm, brisk carotid upstroke without bruits, peripheral pulses very brisk, chest " is clear without rales or wheezing, no pedal edema, no JVD, no hepatosplenomegaly   Abdomen:   gravid, NT   EFW:  3400 g   Pelvis:  Exam deferred., proven to 3600 g   FHTs: 130 BPM + accels - decels mod variability   Contractions: 1 contractions in 10 min mild to palpation   Uterine Size: S=D   Presentations: Cephalic per RN   Cervix: Per RN    Dilation: 3cm    Effacement: 70    Station:  -2    Consistency: Medium    Position: Middle     Complete OB US  10/3/2016 9:03 AM    HISTORY/REASON FOR EXAM:  Evaluate fetal anatomy, alpha-fetoprotein within normal limits    TECHNIQUE/EXAM DESCRIPTION: OB complete ultrasound.    COMPARISON:  None    FINDINGS:  Fetal Lie:  Transverse  LMP:  Unknown  Clinical YOLANDA by LMP:  Not applicable    Placenta (Location):  Anterior  Placenta Previa: No  Placental Grade: I    Amniotic Fluid Volume:  YAMILKA = 16.01 cm    Fetal Heart Rate:  141 bpm    Cervical Length:  3.38 cm transabdominal    No maternal adnexal mass is identified.    Umbilical Artery S/D Ratio(s):  Not applicable    Fetal Anatomy  (Seen or Not Seen)  Lateral Ventricles     Seen  Cisterna Magna        Seen  Cerebellum              Seen  CSP             Seen  Orbits             Seen  Face/Lips                Seen  Cord Insertion         Seen  Placental CI         Seen  4 Chamber Heart     Seen  LVOT               Seen  RVOT              Seen  Stomach       Seen  Kidneys                   Seen  Urinary Bladder      Seen  Spine                       Seen  3 Vessel Cord          Seen  Both Upper Extremities    Seen  Both Lower Extremities    Seen  Diaphragm             Seen  Movement       Seen  Gender:  Likely female    Fetal Biometry  BPD    5.17 cm, 21 weeks, 5 days  HC    18.49 cm, 20 weeks, 6 days  AC    15.04 cm, 20 weeks, 2 days  Femur Length    3.46 cm, 20 weeks, 6 days  Humerus Length    3.14 cm, 20 weeks, 3 days  Cerebellum Diameter   2.21 cm, 20 weeks, 5 days    EGA by this US:  20 weeks, 6 days  YOLANDA by this US:  2/14/2017  YOLANDA by  US:  2/10/2017 by MD    Estimated Fetal Weight:  367 grams    Comments:         Impression        Single intrauterine pregnancy of an estimated gestational age of 20 weeks, 6 days with an estimated date of delivery of 2/14/2017.    Fetal survey within normal limits.         Lab Review  Lab:   Blood type: O     Recent Results (from the past 5880 hour(s))   POCT Pregnancy    Collection Time: 08/17/16  1:45 PM   Result Value Ref Range    POC Urine Pregnancy Test POSITIVE Negative    Internal Control Positive Positive     Internal Control Negative Negative    POCT US - In Clinic OB Scan    Collection Time: 08/30/16 11:01 AM   Result Value Ref Range    In Clinic OB Scan     POCT Urinalysis    Collection Time: 09/16/16  8:54 AM   Result Value Ref Range    POC Color  Negative    POC Appearance  Negative    POC Leukocyte Esterase Negative Negative    POC Nitrites Negative Negative    POC Urobiligen  Negative (0.2) mg/dL    POC Protein Negative Negative mg/dL    POC Urine PH 6.0 5.0 - 8.0    POC Blood Negative Negative    POC Specific Gravity 1.020 <1.005 - >1.030    POC Ketones Negative Negative mg/dL    POC Biliruben  Negative mg/dL    POC Glucose Negative Negative mg/dL   THINPREP RFLX HPV ASCUS W/CTNG    Collection Time: 09/16/16  9:03 AM   Result Value Ref Range    Cytology Reg See Path Report     Source Endo/Cervical     C. trachomatis by PCR Negative Negative    N. gonorrhoeae by PCR Negative Negative   HPV BY PCR ASSOC. W/THINPREP    Collection Time: 09/16/16  9:03 AM   Result Value Ref Range    HPV Genotype 16 Negative Negative    HPV Genotype 18 Negative Negative    HPV Other High Risk Genotypes POSITIVE (A) Negative    Source Endo/Cervical    PREG CNTR PRENATAL PN    Collection Time: 09/16/16  9:53 AM   Result Value Ref Range    WBC 9.5 4.8 - 10.8 K/uL    RBC 4.12 (L) 4.20 - 5.40 M/uL    Hemoglobin 12.4 12.0 - 16.0 g/dL    Hematocrit 37.3 37.0 - 47.0 %    MCV 90.5 81.4 - 97.8 fL    MCH 30.1  27.0 - 33.0 pg    MCHC 33.2 (L) 33.6 - 35.0 g/dL    RDW 45.5 35.9 - 50.0 fL    Platelet Count 258 164 - 446 K/uL    MPV 11.3 9.0 - 12.9 fL    Neutrophils-Polys 76.60 (H) 44.00 - 72.00 %    Lymphocytes 16.80 (L) 22.00 - 41.00 %    Monocytes 4.90 0.00 - 13.40 %    Eosinophils 0.80 0.00 - 6.90 %    Basophils 0.50 0.00 - 1.80 %    Immature Granulocytes 0.40 0.00 - 0.90 %    Nucleated RBC 0.00 /100 WBC    Neutrophils (Absolute) 7.30 (H) 2.00 - 7.15 K/uL    Lymphs (Absolute) 1.60 1.00 - 4.80 K/uL    Monos (Absolute) 0.47 0.00 - 0.85 K/uL    Eos (Absolute) 0.08 0.00 - 0.51 K/uL    Baso (Absolute) 0.05 0.00 - 0.12 K/uL    Immature Granulocytes (abs) 0.04 0.00 - 0.11 K/uL    NRBC (Absolute) 0.00 K/uL    Micro Urine Req Microscopic     Color Yellow     Character Sl Cloudy (A)     Specific Gravity 1.024 <1.035    Ph 6.5 5.0-8.0    Glucose Negative Negative mg/dL    Ketones Negative Negative mg/dL    Protein 30 (A) Negative mg/dL    Bilirubin Negative Negative    Nitrite Negative Negative    Leukocyte Esterase Negative Negative    Occult Blood Negative Negative    Culture Indicated No UA Culture    Rubella IgG Antibody 171.70 IU/mL    Syphilis, Treponemal Qual Non Reactive Non Reactive    Hepatitis B Surface Antigen Negative Negative   AFP TETRA    Collection Time: 09/16/16  9:53 AM   Result Value Ref Range    AFP Value -Eia 76 ng/mL    AFP MOM Value 1.38     Hcg Value 17979 IU/L    Hcg Mom 1.48     Ue3 Value 2.46 ng/mL    Ue3 Mom 1.31     Interpretation Normal     Maternal [age] at YOLANDA 30.7 yr    Gestational Age Based On LNMP     Gestational Age 19.00 weeks    Insulin Dependent Diabetes No     Race Other     Multiple Pregnancy One     Adrianne Value -Eia 228 pg/mL    Adrianne Mom Value 1.30     Maternal Weight 125 lbs    Err Maternal Scrn AFP See Note    HIV ANTIBODIES    Collection Time: 09/16/16  9:53 AM   Result Value Ref Range    HIV Ag/Ab Combo Assay Non Reactive Non Reactive   OP PRENATAL PANEL-BLOOD BANK    Collection Time:  09/16/16  9:53 AM   Result Value Ref Range    ABO Grouping Only O     Rh Grouping Only POS     Antibody Screen Scrn NEG    URINE MICROSCOPIC (W/UA)    Collection Time: 09/16/16 10:00 AM   Result Value Ref Range    RBC 0-2 /hpf    Epithelial Cells Few /hpf    Mucous Threads Few /hpf    Amorphous Crystal Present /hpf   GLUCOSE 1HR GESTATIONAL    Collection Time: 11/28/16 11:32 AM   Result Value Ref Range    Glucose, Post Dose 126 70 - 139 mg/dL   HCT    Collection Time: 11/28/16 11:32 AM   Result Value Ref Range    Hematocrit 36.5 (L) 37.0 - 47.0 %   HGB    Collection Time: 11/28/16 11:32 AM   Result Value Ref Range    Hemoglobin 12.3 12.0 - 16.0 g/dL   T.PALLIDUM AB EIA    Collection Time: 11/28/16 11:32 AM   Result Value Ref Range    Syphilis, Treponemal Qual Non Reactive Non Reactive   GRP B STREP, BY PCR (GAGE BROTH)    Collection Time: 01/12/17  3:30 PM   Result Value Ref Range    Strep Gp B DNA PCR Negative Negative   Hold Blood Bank Specimen (Not Tested)    Collection Time: 02/08/17  4:45 AM   Result Value Ref Range    Holding Tube - Bb DONE    CBC WITH DIFFERENTIAL    Collection Time: 02/08/17  4:45 AM   Result Value Ref Range    WBC 11.0 (H) 4.8 - 10.8 K/uL    RBC 4.24 4.20 - 5.40 M/uL    Hemoglobin 13.5 12.0 - 16.0 g/dL    Hematocrit 38.8 37.0 - 47.0 %    MCV 91.5 81.4 - 97.8 fL    MCH 31.8 27.0 - 33.0 pg    MCHC 34.8 33.6 - 35.0 g/dL    RDW 45.8 35.9 - 50.0 fL    Platelet Count 222 164 - 446 K/uL    MPV 11.2 9.0 - 12.9 fL    Neutrophils-Polys 67.50 44.00 - 72.00 %    Lymphocytes 25.00 22.00 - 41.00 %    Monocytes 4.90 0.00 - 13.40 %    Eosinophils 1.50 0.00 - 6.90 %    Basophils 0.60 0.00 - 1.80 %    Immature Granulocytes 0.50 0.00 - 0.90 %    Nucleated RBC 0.00 /100 WBC    Neutrophils (Absolute) 7.44 (H) 2.00 - 7.15 K/uL    Lymphs (Absolute) 2.75 1.00 - 4.80 K/uL    Monos (Absolute) 0.54 0.00 - 0.85 K/uL    Eos (Absolute) 0.16 0.00 - 0.51 K/uL    Baso (Absolute) 0.07 0.00 - 0.12 K/uL    Immature  Granulocytes (abs) 0.05 0.00 - 0.11 K/uL    NRBC (Absolute) 0.00 K/uL        Assessment:   1.  IUP at 39w5d  2.  Labor status: SROM and Early latent labor.  3.  Cat 1 FHTs  4.  Obstetrical history significant for   Patient Active Problem List    Diagnosis Date Noted   • Encounter for supervision of normal pregnancy in multigravida, antepartum 09/16/2016   .      Plan:     Admit to L&D  GBS negative  Routine labs  Pain management prn - pt may want epidural  Pitocin per protocol.

## 2017-02-08 NOTE — IP AVS SNAPSHOT
2/9/2017          Isabel Larisadextre Galarza  [address] [place] [address]    Dear Isabel:    [facility] wants to ensure your discharge home is safe and you or your loved ones have had all your questions answered regarding your care after you leave the hospital.    You may receive a telephone call within two days of your discharge.  This call is to make certain you understand your discharge instructions as well as ensure we provided you with the best care possible during your stay with us.     The call will only last approximately 3-5 minutes and will be done by a nurse.    Once again, we want to ensure your discharge home is safe and that you have a clear understanding of any next steps in your care.  If you have any questions or concerns, please do not hesitate to contact us, we are here for you.  Thank you for choosing [facility] for your healthcare needs.    Sincerely,    Nikolai Rhoades    [facility]

## 2017-02-08 NOTE — DELIVERY NOTE
Vaginal Delivery Note    Isabel Galarza is a  7, now para 6006, who presented in early latent labor and with membranes SROM at 39w5d.  Patient progressed in active labor with pitocin augmentation/induction to cervical exam 100%; cervical dilation 10; station +2 to complete the first stage of labor.  Patient then progressed through second stage and delivered spontaneously a viable female infant over an intact perineum.  Nuchal cord present.  Infant Apgar 8 and 9, and weight pending.    During the third stage the placenta was delivered spontaneously and was intact with 3 vessels    Assisted extraction:  none    Perineum repair:  none    Analgesia: none    Epidural:  yes    Family support:  yes    Infant bonding:  excellent    Estimated blood loss:  350 mL    Sponge count correct:  yes    Patient tolerated the procedure:  excellent

## 2017-02-08 NOTE — CARE PLAN
Problem: Knowledge Deficit  Goal: Patient/Support person demonstrates understanding regarding the progression of labor, available options and participates in decision-making process  Outcome: PROGRESSING AS EXPECTED  POC and birthing preferences discussed with pt and FOB. Pt last delivery 5 years ago. First baby for FOB, questions answered, [facility] policies and procedures regarding delivery explained     Problem: Pain  Goal: Alleviation of Pain or a reduction in pain to the patient’s comfort goal  Outcome: PROGRESSING AS EXPECTED  POC regarding pain control discussed with pt, epidural placed by Dr. Stewart. Pt comfortable with epidural, will continue to reassess pain q hour

## 2017-02-08 NOTE — IP AVS SNAPSHOT
Tugg Access Code: Activation code not generated  Current Tugg Status: Patient Declined    [employer]  A secure, online tool to manage your health information     [employer]’s Tugg® is a secure, online tool that connects you to your personalized health information from the privacy of your home -- day or night - making it very easy for you to manage your healthcare. Once the activation process is completed, you can even access your medical information using the Tugg cordell, which is available for free in the Apple Cordell store or Google Play store.     Tugg provides the following levels of access (as shown below):   My Chart Features   [facility] Primary Care Doctor [facility]  Specialists [facility]  Urgent  Care [facility]  Primary Care  Doctor   Email your healthcare team securely and privately 24/7 X X X X   Manage appointments: schedule your next appointment; view details of past/upcoming appointments X      Request prescription refills. X      View recent personal medical records, including lab and immunizations X X X X   View health record, including health history, allergies, medications X X X X   Read reports about your outpatient visits, procedures, consult and ER notes X X X X   See your discharge summary, which is a recap of your hospital and/or ER visit that includes your diagnosis, lab results, and care plan. X X       How to register for Tugg:  1. Go to  [email or web address].  2. Click on the Sign Up Now box, which takes you to the New Member Sign Up page. You will need to provide the following information:  a. Enter your Tugg Access Code exactly as it appears at the top of this page. (You will not need to use this code after you’ve completed the sign-up process. If you do not sign up before the expiration date, you must request a new code.)   b. Enter your date of birth.   c. Enter your home email address.   d. Click Submit, and follow the next screen’s instructions.  3. Create a Tugg ID.  This will be your [employer] login ID and cannot be changed, so think of one that is secure and easy to remember.  4. Create a [employer] password. You can change your password at any time.  5. Enter your Password Reset Question and Answer. This can be used at a later time if you forget your password.   6. Enter your e-mail address. This allows you to receive e-mail notifications when new information is available in [employer].  7. Click Sign Up. You can now view your health information.    For assistance activating your [employer] account, call [phone]

## 2017-02-09 VITALS
DIASTOLIC BLOOD PRESSURE: 65 MMHG | RESPIRATION RATE: 18 BRPM | HEIGHT: 57 IN | WEIGHT: 137 LBS | OXYGEN SATURATION: 96 % | BODY MASS INDEX: 29.56 KG/M2 | HEART RATE: 58 BPM | SYSTOLIC BLOOD PRESSURE: 102 MMHG | TEMPERATURE: 98 F

## 2017-02-09 PROCEDURE — A9270 NON-COVERED ITEM OR SERVICE: HCPCS | Performed by: OBSTETRICS & GYNECOLOGY

## 2017-02-09 PROCEDURE — 700102 HCHG RX REV CODE 250 W/ 637 OVERRIDE(OP): Performed by: OBSTETRICS & GYNECOLOGY

## 2017-02-09 PROCEDURE — 700102 HCHG RX REV CODE 250 W/ 637 OVERRIDE(OP): Performed by: NURSE PRACTITIONER

## 2017-02-09 PROCEDURE — A9270 NON-COVERED ITEM OR SERVICE: HCPCS | Performed by: NURSE PRACTITIONER

## 2017-02-09 RX ORDER — IBUPROFEN 600 MG/1
600 TABLET ORAL EVERY 6 HOURS PRN
Qty: 30 TAB | Refills: 0 | Status: SHIPPED | OUTPATIENT
Start: 2017-02-09 | End: 2023-03-03

## 2017-02-09 RX ADMIN — Medication 1 TABLET: at 08:06

## 2017-02-09 RX ADMIN — IBUPROFEN 600 MG: 600 TABLET, FILM COATED ORAL at 05:33

## 2017-02-09 ASSESSMENT — PAIN SCALES - GENERAL
PAINLEVEL_OUTOF10: 0
PAINLEVEL_OUTOF10: 0
PAINLEVEL_OUTOF10: 2

## 2017-02-09 NOTE — DISCHARGE SUMMARY
Discharge Summary:      Isabel Galarza      Admit Date:   2017  Discharge Date:  2017     Admitting diagnosis:  Pregnancy  Normal labor  Discharge Diagnosis: Status post vaginal, spontaneous.  Pregnancy Complications: none  Tubal Ligation:  no        History:  Past Medical History   Diagnosis Date   • Ear infection      FREQ     OB History    Para Term  AB SAB TAB Ectopic Multiple Living   9 5 5  3 2  1  5      # Outcome Date GA Lbr Julian/2nd Weight Sex Delivery Anes PTL Lv   9 Current            8 2014         Comments: Baby passed on its own.   7 2013         Comments: Baby passed on  it own   6 Term 11/23/10 40w0d   M Vag-Spont EPI N Y   5 Term 08/10/09 39w1d 08:00 3.26 kg (7 lb 3 oz) F Vag-Spont None N Y   4 Term 10/23/07 40w0d 08:00  M Vag-Spont None N Y   3 Term 06 38w2d 10:00  F Vag-Spont None N Y   2 Ectopic 2005 5w0d          1 Term 04/10/02 40w0d 15:00  F Vag-Spont None N Y           Bloodless and Sulfa drugs  Patient Active Problem List    Diagnosis Date Noted   • Encounter for supervision of normal pregnancy in multiavi, antepartum 2016        Hospital Course:   31 y.o. , now para 6, was admitted with the above mentioned diagnosis, underwent Active Labor, vaginal, spontaneous. Patient postpartum course was unremarkable, with progressive advancement in diet , ambulation and toleration of oral analgesia. Patient without complaints today and desires discharge.      Filed Vitals:    17 1627 17 1725 17 2000 17 0000   BP: 115/66 111/64 110/63 102/49   Pulse: 77 79 70 65   Temp:  37 °C (98.6 °F) 36.7 °C (98.1 °F) 36.8 °C (98.2 °F)   TempSrc:       Resp:  20 17 12   Height:       Weight:       SpO2:  96% 95% 95%       Current Facility-Administered Medications   Medication Dose   • ondansetron (ZOFRAN ODT) dispertab 4 mg  4 mg    Or   • ondansetron (ZOFRAN) syringe/vial injection 4 mg  4 mg   • metoclopramide  (REGLAN) injection 10 mg  10 mg   • oxytocin (PITOCIN) infusion (for postpartum)   mL/hr   • ibuprofen (MOTRIN) tablet 600 mg  600 mg   • acetaminophen (TYLENOL) tablet 325 mg  325 mg   • hydrocodone-acetaminophen (NORCO) 5-325 MG per tablet 1 Tab  1 Tab   • hydrocodone/acetaminophen (NORCO)  MG per tablet 1 Tab  1 Tab   • LR infusion     • misoprostol (CYTOTEC) tablet 800 mcg  800 mcg   • docusate sodium (COLACE) capsule 100 mg  100 mg   • prenatal plus vitamin (STUARTNATAL 1+1) 27-1 MG tablet 1 Tab  1 Tab       Exam:  Breast Exam: negative  Abdomen: Abdomen soft, non-tender. BS normal. No masses,  No organomegaly  Fundus Non Tender: yes  Incision: none  Perineum: perineum intact  Extremity: no edema, redness or tenderness in the calves or thighs, Homans sign is negative, no sign of DVT, feet normal, good pulses, normal color, temperature and sensation     Labs:  Recent Labs      02/08/17   0445  02/08/17   2304   WBC  11.0*  20.0*   RBC  4.24  4.09*   HEMOGLOBIN  13.5  12.9   HEMATOCRIT  38.8  37.9   MCV  91.5  92.7   MCH  31.8  31.5   MCHC  34.8  34.0   RDW  45.8  45.9   PLATELETCT  222  191   MPV  11.2  11.1        Activity:   Discharge to home  Pelvic Rest x 6 weeks    Assessment:  Normal postpartum course. Requests to leve today; declines pain medications other than Motrin and declines stool softeners which were offered.   Discharge Assessment: Taking adequate diet and fluids, No heavy bleeding or foul vaginal discharge , No breast redness or severe pain of breast. Voiding spontaneously and without difficulty.      Follow up: [facility] in 5 weeks for vaginal.   To resume daily PNV and iron supplement if needed with hydration.   Patient to [facility] or ER if any of the following occur:  Fever over 100.5  Severe abdominal pain  Red streaks or painful masses in the breasts  Foul smelling discharge or lochia  Heavy vaginal bleeding saturating a pad per hour  S/s of PP  depression     Discharge Meds:   Current Outpatient Prescriptions   Medication Sig Dispense Refill   • ibuprofen (MOTRIN) 600 MG Tab Take 1 Tab by mouth every 6 hours as needed (for cramping after delivery). 30 Tab 0       Raf Zuniga M.D.

## 2017-02-09 NOTE — CARE PLAN
Problem: Potential for postpartum infection related to presence of episiotomy/vaginal tear and/or uterine contamination  Goal: Patient will be absent from signs and symptoms of infection  Outcome: PROGRESSING AS EXPECTED  Pt' vital signs remain within defined limits.  No signs of infection are present at this time.  Will continue to monitor per hospital policy.

## 2017-02-09 NOTE — PROGRESS NOTES
Received report from Millie Tidwell RN; Assumed care of pt.    2100- POC discussed with pt and opportunity provided for questions.  Information given and pt verbalized understanding. Denies having any further questions at this time.  No signs of distress observed in pt.  FOB at bedside for support.  Will continue to monitor per hospital policy.    0145- Pt requesting Similac formula for supplementation.  Educated pt on risks to milk production if infant is not placed to breast and possible consequence of artificial nipple confusion for infant.  Pt verbalized understanding and is still requesting Similac formula be given.  States her nipples are too sore.  Offered to assist with latching infant onto breast to correct malposition, but pt declined.  Similac formula given along with feeding instructions.  Pt again verbalized understanding.

## 2017-02-09 NOTE — DISCHARGE INSTRUCTIONS
POSTPARTUM DISCHARGE INSTRUCTIONS FOR MOM    YOB: 1985   Age: 31 y.o.               Admit Date: 2017     Discharge Date: 2017  Attending Doctor:  Toribio Hutchison M.D.                  Allergies:  Bloodless and Sulfa drugs    Discharged to home by car. Discharged via wheelchair, hospital escort: Yes.  Special equipment needed: Not Applicable  Belongings with: Personal  Be sure to schedule a follow-up appointment with your primary care doctor or any specialists as instructed.     Discharge Plan:   Influenza Vaccine Indication: Patient Refuses    REASONS TO CALL YOUR OBSTETRICIAN:  1.   Persistent fever or shaking chills (Temperature higher than 100.4)  2.   Heavy bleeding (soaking more than 1 pad per hour); Passing clots  3.   Foul odor from vagina  4.   Mastitis (Breast infection; breast pain, chills, fever, redness)  5.   Urinary pain, burning or frequency  6.   Episiotomy infection  7.   Abdominal incision infection  8.   Severe depression longer than 24 hours    HAND WASHING  · Prior to handling the baby.  · Before breastfeeding or bottle feeding baby.  · After using the bathroom or changing the baby's diaper.    WOUND CARE  Ask your physician for additional care instructions.  In general:    ·  Incision:      · Keep clean and dry.    · Do NOT lift anything heavier than your baby for up to 6 weeks.    · There should not be any opening or pus.      VAGINAL CARE  · Nothing inside vagina for 6 weeks: no sexual intercourse, tampons or douching.  · Bleeding may continue for 2-4 weeks.  Amount may vary.    · Call your physician for heavy bleeding which means soaking more than 1 pad per hour    BIRTH CONTROL  · It is possible to become pregnant at any time after delivery and while breastfeeding.  · Plan to discuss a method of birth control with your physician at your follow up visit. visit.    DIET AND ELIMINATION  · Eating more fiber (bran cereal, fruits, and vegetables) and drinking plenty  "of fluids will help to avoid constipation.  · Urinary frequency after childbirth is normal.    POSTPARTUM BLUES  During the first few days after birth, you may experience a sense of the \"blues\" which may include impatience, irritability or even crying.  These feeling come and go quickly.  However, as many as 1 in 10 women experience emotional symptoms known as postpartum depression.    Postpartum depression:  May start as early as the second or third day after delivery or take several weeks or months to develop.  Symptoms of \"blues\" are present, but are more intense:  Crying spells; loss of appetite; feelings of hopelessness or loss of control; fear of touching the baby; over concern or no concern at all about the baby; little or no concern about your own appearance/caring for yourself; and/or inability to sleep or excessive sleeping.  Contact your physician if you are experiencing any of these symptoms.    Crisis Hotline:  · [place] Crisis Hotline:  7-725-RGYKLMS  Or 1-779.456.6723  · [place] Crisis Hotline:  1-137.164.7200  Or 844-610-0934    PREVENTING SHAKEN BABY:  If you are angry or stressed, PUT THE BABY IN THE CRIB, step away, take some deep breaths, and wait until you are calm to care for the baby.  DO NOT SHAKE THE BABY.  You are not alone, call a supporter for help.    · Crisis Call Center 24/7 crisis line 549-092-1956 or 1-253.447.6229  · You can also text them, text \"ANSWER\" to 700316    QUIT SMOKING/TOBACCO USE:  I understand the use of any tobacco products increases my chance of suffering from future heart disease and could cause other illnesses which may shorten my life. Quitting the use of tobacco products is the single most important thing I can do to improve my health. For further information on smoking / tobacco cessation call a Toll Free Quit Line at 1-685.718.9653 (*[employer] [place]) or 1-372.816.1298 (American Lung Association) or you can access the web based program at " www.lungusa.org.    · [place] Tobacco Users Help Line:  (826) 539-1884       Toll Free: 1-867.498.1648  · Quit Tobacco Program [facility] (718)635-8880    DEPRESSION / SUICIDE RISK:  As you are discharged from this [facility], it is important to learn how to keep safe from harming yourself.    Recognize the warning signs:  · Abrupt changes in personality, positive or negative- including increase in energy   · Giving away possessions  · Change in eating patterns- significant weight changes-  positive or negative  · Change in sleeping patterns- unable to sleep or sleeping all the time   · Unwillingness or inability to communicate  · Depression  · Unusual sadness, discouragement and loneliness  · Talk of wanting to die  · Neglect of personal appearance   · Rebelliousness- reckless behavior  · Withdrawal from people/activities they love  · Confusion- inability to concentrate     If you or a loved one observes any of these behaviors or has concerns about self-harm, here's what you can do:  · Talk about it- your feelings and reasons for harming yourself  · Remove any means that you might use to hurt yourself (examples: pills, rope, extension cords, firearm)  · Get professional help from the community (Mental Health, Substance Abuse, psychological counseling)  · Do not be alone:Call your Safe Contact- someone whom you trust who will be there for you.  · Call your local CRISIS HOTLINE 648-1012 or 849-481-8879  · Call your local Children's Mobile Crisis Response Team [place] (639) 100-3368 or www.C[employer]  · Call the toll free National Suicide Prevention Hotlines   · National Suicide Prevention Lifeline 265-574-DSKE (7934)  · National Hope Line Network 800-SUICIDE (917-3906)    DISCHARGE SURVEY:  Thank you for choosing [facility].  We hope we provided you with very good care.  You may be receiving a survey in the mail.  Please fill it out.  Your opinion is valuable to  us.    ADDITIONAL EDUCATIONAL MATERIALS GIVEN TO PATIENT:        My signature on this form indicates that:  1.  I have reviewed and understand the above information  2.  My questions regarding this information have been answered to my satisfaction.  3.  I have formulated a plan with my discharge nurse to obtain my prescribed medication for home.

## 2017-02-09 NOTE — CONSULTS
"Lactation consultation note:    Met with mother for help with latch. This is her sixth baby but the first she is nursing. Baby's father is at the bedside and very supportive. Baby is already latched to left breast but latch appears somewhat shallow. Baby was detached and nipple noted to be mildly ridged. Talked to mother about the importance of a good, deep latch for baby getting milk and helping increase milk supply. Helped mother latch by showing her to wait for baby to open mouth widely and latch with chin into breast. Baby is very eager to nurse and talked to mother about not letting baby continue to nurse if the latch feels too shallow. She is complaining of soreness but states \"I think that's all from yesterday.\"  Had parents watch [employer] videos on latch and breastfeeding holds. She was given the BF booklet with outpatient resources. She has paperwork to apply for WIC. Castillo RN, IBCLC  "

## 2017-02-09 NOTE — CARE PLAN
Problem: Altered physiologic condition related to immediate post-delivery state and potential for bleeding/hemorrhage  Goal: Patient physiologically stable as evidenced by normal lochia, palpable uterine involution and vital signs within normal limits  Outcome: PROGRESSING AS EXPECTED  Pt's fundus remains firm with scant rubra lochia observed.  No signs of hemorrhage are present.  Will continue to monitor per hospital policy.

## 2017-02-09 NOTE — PROGRESS NOTES
Pt arrived via wheelchair with belongings to room S302. Report received from Sahwna Griffith RN. Pt oriented to room, call light & infant security. Assessment done. Fundus firm, lochia scant. Vital signs stable. IV infusing 125 of pitocin. Pt states pain is tolerable, see MAR. Pt aware of dangers related to sleeping with infant, reviewed plan of care with pt & encouraged to call with needs or prior to ambulating. Call light in place. Will continue to monitor.

## 2017-02-09 NOTE — CARE PLAN
Problem: Potential for postpartum infection related to presence of episiotomy/vaginal tear and/or uterine contamination  Goal: Patient will be absent from signs and symptoms of infection  Outcome: PROGRESSING AS EXPECTED  Patient shows no s/s of infection.  Will continue to monitor with Q6H VS after transition is complete and hourly rounding.    Problem: Alteration in comfort related to episiotomy, vaginal repair and/or after birth pains  Goal: Patient verbalizes acceptable pain level  Outcome: PROGRESSING AS EXPECTED  Patient states that she is in pain and would like Motrin.  Patient medicated, used 0-10 pain scale, and will continue hourly rounding.

## 2017-03-02 NOTE — ADDENDUM NOTE
Encounter addended by: Shirin Valdez R.N. on: 3/2/2017 12:59 PM<BR>     Documentation filed: Inpatient Document Flowsheet

## 2023-01-14 ENCOUNTER — HOSPITAL ENCOUNTER (EMERGENCY)
Facility: MEDICAL CENTER | Age: 38
End: 2023-01-14
Attending: STUDENT IN AN ORGANIZED HEALTH CARE EDUCATION/TRAINING PROGRAM
Payer: MEDICAID

## 2023-01-14 VITALS
HEART RATE: 70 BPM | HEIGHT: 57 IN | OXYGEN SATURATION: 98 % | DIASTOLIC BLOOD PRESSURE: 65 MMHG | SYSTOLIC BLOOD PRESSURE: 110 MMHG | RESPIRATION RATE: 18 BRPM | TEMPERATURE: 98.1 F | WEIGHT: 103.17 LBS | BODY MASS INDEX: 22.26 KG/M2

## 2023-01-14 DIAGNOSIS — O23.41 URINARY TRACT INFECTION IN MOTHER DURING FIRST TRIMESTER OF PREGNANCY: ICD-10-CM

## 2023-01-14 DIAGNOSIS — E86.0 DEHYDRATION: ICD-10-CM

## 2023-01-14 DIAGNOSIS — O21.0 HYPEREMESIS GRAVIDARUM: ICD-10-CM

## 2023-01-14 LAB
ALBUMIN SERPL BCP-MCNC: 4.1 G/DL (ref 3.2–4.9)
ALBUMIN/GLOB SERPL: 1.2 G/DL
ALP SERPL-CCNC: 75 U/L (ref 30–99)
ALT SERPL-CCNC: 21 U/L (ref 2–50)
ANION GAP SERPL CALC-SCNC: 14 MMOL/L (ref 7–16)
APPEARANCE UR: CLEAR
AST SERPL-CCNC: 21 U/L (ref 12–45)
B-HCG SERPL-ACNC: ABNORMAL MIU/ML (ref 0–5)
BACTERIA #/AREA URNS HPF: ABNORMAL /HPF
BASOPHILS # BLD AUTO: 0.5 % (ref 0–1.8)
BASOPHILS # BLD: 0.06 K/UL (ref 0–0.12)
BILIRUB SERPL-MCNC: 0.7 MG/DL (ref 0.1–1.5)
BILIRUB UR QL STRIP.AUTO: NEGATIVE
BUN SERPL-MCNC: 10 MG/DL (ref 8–22)
CALCIUM ALBUM COR SERPL-MCNC: 9.4 MG/DL (ref 8.5–10.5)
CALCIUM SERPL-MCNC: 9.5 MG/DL (ref 8.5–10.5)
CHLORIDE SERPL-SCNC: 99 MMOL/L (ref 96–112)
CO2 SERPL-SCNC: 22 MMOL/L (ref 20–33)
COLOR UR: YELLOW
CREAT SERPL-MCNC: 0.47 MG/DL (ref 0.5–1.4)
EOSINOPHIL # BLD AUTO: 0.05 K/UL (ref 0–0.51)
EOSINOPHIL NFR BLD: 0.5 % (ref 0–6.9)
EPI CELLS #/AREA URNS HPF: ABNORMAL /HPF
ERYTHROCYTE [DISTWIDTH] IN BLOOD BY AUTOMATED COUNT: 37.7 FL (ref 35.9–50)
GFR SERPLBLD CREATININE-BSD FMLA CKD-EPI: 126 ML/MIN/1.73 M 2
GLOBULIN SER CALC-MCNC: 3.3 G/DL (ref 1.9–3.5)
GLUCOSE SERPL-MCNC: 98 MG/DL (ref 65–99)
GLUCOSE UR STRIP.AUTO-MCNC: NEGATIVE MG/DL
HCG SERPL QL: POSITIVE
HCT VFR BLD AUTO: 40.5 % (ref 37–47)
HGB BLD-MCNC: 14.5 G/DL (ref 12–16)
HYALINE CASTS #/AREA URNS LPF: ABNORMAL /LPF
IMM GRANULOCYTES # BLD AUTO: 0.05 K/UL (ref 0–0.11)
IMM GRANULOCYTES NFR BLD AUTO: 0.5 % (ref 0–0.9)
KETONES UR STRIP.AUTO-MCNC: 80 MG/DL
LEUKOCYTE ESTERASE UR QL STRIP.AUTO: NEGATIVE
LIPASE SERPL-CCNC: 21 U/L (ref 11–82)
LYMPHOCYTES # BLD AUTO: 2.42 K/UL (ref 1–4.8)
LYMPHOCYTES NFR BLD: 22 % (ref 22–41)
MCH RBC QN AUTO: 31.5 PG (ref 27–33)
MCHC RBC AUTO-ENTMCNC: 35.8 G/DL (ref 33.6–35)
MCV RBC AUTO: 87.9 FL (ref 81.4–97.8)
MICRO URNS: ABNORMAL
MONOCYTES # BLD AUTO: 0.81 K/UL (ref 0–0.85)
MONOCYTES NFR BLD AUTO: 7.4 % (ref 0–13.4)
NEUTROPHILS # BLD AUTO: 7.6 K/UL (ref 2–7.15)
NEUTROPHILS NFR BLD: 69.1 % (ref 44–72)
NITRITE UR QL STRIP.AUTO: NEGATIVE
NRBC # BLD AUTO: 0 K/UL
NRBC BLD-RTO: 0 /100 WBC
PH UR STRIP.AUTO: 6 [PH] (ref 5–8)
PLATELET # BLD AUTO: 293 K/UL (ref 164–446)
PMV BLD AUTO: 10.4 FL (ref 9–12.9)
POTASSIUM SERPL-SCNC: 3.4 MMOL/L (ref 3.6–5.5)
PROT SERPL-MCNC: 7.4 G/DL (ref 6–8.2)
PROT UR QL STRIP: NEGATIVE MG/DL
RBC # BLD AUTO: 4.61 M/UL (ref 4.2–5.4)
RBC # URNS HPF: ABNORMAL /HPF
RBC UR QL AUTO: NEGATIVE
SODIUM SERPL-SCNC: 135 MMOL/L (ref 135–145)
SP GR UR STRIP.AUTO: 1.03
UROBILINOGEN UR STRIP.AUTO-MCNC: 1 MG/DL
WBC # BLD AUTO: 11 K/UL (ref 4.8–10.8)
WBC #/AREA URNS HPF: ABNORMAL /HPF

## 2023-01-14 PROCEDURE — 96375 TX/PRO/DX INJ NEW DRUG ADDON: CPT

## 2023-01-14 PROCEDURE — 85025 COMPLETE CBC W/AUTO DIFF WBC: CPT

## 2023-01-14 PROCEDURE — 81003 URINALYSIS AUTO W/O SCOPE: CPT

## 2023-01-14 PROCEDURE — 700105 HCHG RX REV CODE 258: Performed by: STUDENT IN AN ORGANIZED HEALTH CARE EDUCATION/TRAINING PROGRAM

## 2023-01-14 PROCEDURE — 83690 ASSAY OF LIPASE: CPT

## 2023-01-14 PROCEDURE — 700111 HCHG RX REV CODE 636 W/ 250 OVERRIDE (IP): Performed by: STUDENT IN AN ORGANIZED HEALTH CARE EDUCATION/TRAINING PROGRAM

## 2023-01-14 PROCEDURE — 81015 MICROSCOPIC EXAM OF URINE: CPT

## 2023-01-14 PROCEDURE — 84703 CHORIONIC GONADOTROPIN ASSAY: CPT

## 2023-01-14 PROCEDURE — 84702 CHORIONIC GONADOTROPIN TEST: CPT

## 2023-01-14 PROCEDURE — 80053 COMPREHEN METABOLIC PANEL: CPT

## 2023-01-14 PROCEDURE — 99285 EMERGENCY DEPT VISIT HI MDM: CPT

## 2023-01-14 PROCEDURE — 96374 THER/PROPH/DIAG INJ IV PUSH: CPT

## 2023-01-14 PROCEDURE — 36415 COLL VENOUS BLD VENIPUNCTURE: CPT

## 2023-01-14 RX ORDER — ONDANSETRON 2 MG/ML
4 INJECTION INTRAMUSCULAR; INTRAVENOUS ONCE
Status: COMPLETED | OUTPATIENT
Start: 2023-01-14 | End: 2023-01-14

## 2023-01-14 RX ORDER — SODIUM CHLORIDE 9 MG/ML
1000 INJECTION, SOLUTION INTRAVENOUS ONCE
Status: COMPLETED | OUTPATIENT
Start: 2023-01-14 | End: 2023-01-14

## 2023-01-14 RX ORDER — CEPHALEXIN 500 MG/1
500 CAPSULE ORAL 2 TIMES DAILY
Qty: 10 CAPSULE | Refills: 0 | Status: ACTIVE | OUTPATIENT
Start: 2023-01-14 | End: 2023-01-19

## 2023-01-14 RX ORDER — ONDANSETRON 4 MG/1
4 TABLET, ORALLY DISINTEGRATING ORAL EVERY 6 HOURS PRN
Qty: 10 TABLET | Refills: 0 | Status: SHIPPED | OUTPATIENT
Start: 2023-01-14 | End: 2023-03-30

## 2023-01-14 RX ORDER — SODIUM CHLORIDE, SODIUM LACTATE, POTASSIUM CHLORIDE, CALCIUM CHLORIDE 600; 310; 30; 20 MG/100ML; MG/100ML; MG/100ML; MG/100ML
1000 INJECTION, SOLUTION INTRAVENOUS ONCE
Status: COMPLETED | OUTPATIENT
Start: 2023-01-14 | End: 2023-01-14

## 2023-01-14 RX ORDER — PYRIDOXINE HCL (VITAMIN B6) 50 MG
50 TABLET ORAL
Qty: 30 TABLET | Refills: 0 | Status: SHIPPED | OUTPATIENT
Start: 2023-01-14 | End: 2023-03-03

## 2023-01-14 RX ADMIN — ONDANSETRON HYDROCHLORIDE 4 MG: 2 SOLUTION INTRAMUSCULAR; INTRAVENOUS at 02:41

## 2023-01-14 RX ADMIN — SODIUM CHLORIDE, POTASSIUM CHLORIDE, SODIUM LACTATE AND CALCIUM CHLORIDE 1000 ML: 600; 310; 30; 20 INJECTION, SOLUTION INTRAVENOUS at 03:41

## 2023-01-14 RX ADMIN — SODIUM CHLORIDE 1000 ML: 9 INJECTION, SOLUTION INTRAVENOUS at 02:42

## 2023-01-14 RX ADMIN — PYRIDOXINE HYDROCHLORIDE 20 MG: 100 INJECTION, SOLUTION INTRAMUSCULAR; INTRAVENOUS at 03:39

## 2023-01-14 NOTE — DISCHARGE INSTRUCTIONS
Start taking prenatal vitamins.  Take the medication we prescribed for nausea and vomiting.  Stay hydrated.  Drink plenty of water.  Follow-up with OB/GYN as soon as possible for pregnancy care.

## 2023-01-14 NOTE — ED PROVIDER NOTES
ED Provider Note    CHIEF COMPLAINT  Chief Complaint   Patient presents with    Vomiting     The pt reports vomiting since . The pt denies bloody vomit. The pt unable to keep anything down. The reports a positive home pregnancy test on . . The pt reports associated abd pain. Pain is non-radiating, 5/10, sharp    Abdominal Pain       HPI/ROS  LIMITATION TO HISTORY   Select: : None    Isabel Galarza is a 37 y.o. female who presents with nausea and vomiting since New Year's.  Patient states her last menstrual period was , she took a home pregnancy test  that was positive.  She does not have an appointment scheduled with OB/GYN.  She reports her nausea and vomiting started getting worse around New Year's and since that time has been slowly worsening.  She reports associated abdominal cramping and bilateral flank pain, but denies dysuria.  Denies fevers.  Denies chest pain or shortness of breath.  She has a history of ectopic pregnancy in the past, no pelvic pain.  Denies any vaginal bleeding, small amount of vaginal discharge.  Patient reports nausea and vomiting with prior pregnancies but states this is worse than previously.    PAST MEDICAL HISTORY   has a past medical history of Ear infection.    SURGICAL HISTORY   has a past surgical history that includes tube & ectopic preg., removal () and gyn surgery.    FAMILY HISTORY  Family History   Problem Relation Age of Onset    No Known Problems Mother     No Known Problems Father        SOCIAL HISTORY  Social History     Tobacco Use    Smoking status: Never    Smokeless tobacco: Never   Vaping Use    Vaping Use: Never used   Substance and Sexual Activity    Alcohol use: Yes     Comment: socially    Drug use: Yes     Types: Marijuana     Comment: occ    Sexual activity: Yes     Partners: Male     Comment: no birth control       CURRENT MEDICATIONS  Home Medications       Reviewed by Erickson Sanabria R.N. (Registered Nurse) on 23 at  "0140  Med List Status: Partial     Medication Last Dose Status   ibuprofen (MOTRIN) 600 MG Tab  Active   Prenatal MV-Min-Fe Fum-FA-DHA (PRENATAL 1 PO)  Active                    ALLERGIES  Allergies   Allergen Reactions    Bloodless      Will not accept any kind of blood product that is not her own even in the case of emergency    Kiwi Extract Swelling    Pineapple Swelling    Sulfa Drugs Hives     \"when I was in assisted\"  \"I looked like a lobster\"       PHYSICAL EXAM  VITAL SIGNS: /65   Pulse 70   Temp 36.7 °C (98.1 °F) (Temporal)   Resp 18   Ht 1.448 m (4' 9\")   Wt 46.8 kg (103 lb 2.8 oz)   LMP 11/24/2022 (Approximate)   SpO2 98%   BMI 22.33 kg/m²    Constitutional: Alert in no apparent distress.  HENT: No signs of trauma, Bilateral external ears normal, Nose normal.   Eyes: Pupils are equal and reactive, Conjunctiva normal, Non-icteric.   Neck: Normal range of motion, No tenderness, Supple, No stridor.   Cardiovascular: Regular rate and rhythm, no murmurs.   Thorax & Lungs: Normal breath sounds, No respiratory distress, No wheezing  Abdomen: Soft, No tenderness, No peritoneal signs, No masses, No pulsatile masses.   Skin: Warm, Dry, No erythema, No rash.   Extremities: Intact distal pulses, No edema, No tenderness, No cyanosis  Musculoskeletal: Good range of motion in all major joints. No tenderness to palpation or major deformities noted.   Neurologic: Alert , Normal motor function, Normal speech, No focal deficits noted.   Psychiatric: Affect normal, Judgment normal, Mood normal.       DIAGNOSTIC STUDIES / PROCEDURES    LABS  Results for orders placed or performed during the hospital encounter of 01/14/23   CBC WITH DIFFERENTIAL   Result Value Ref Range    WBC 11.0 (H) 4.8 - 10.8 K/uL    RBC 4.61 4.20 - 5.40 M/uL    Hemoglobin 14.5 12.0 - 16.0 g/dL    Hematocrit 40.5 37.0 - 47.0 %    MCV 87.9 81.4 - 97.8 fL    MCH 31.5 27.0 - 33.0 pg    MCHC 35.8 (H) 33.6 - 35.0 g/dL    RDW 37.7 35.9 - 50.0 fL    " Platelet Count 293 164 - 446 K/uL    MPV 10.4 9.0 - 12.9 fL    Neutrophils-Polys 69.10 44.00 - 72.00 %    Lymphocytes 22.00 22.00 - 41.00 %    Monocytes 7.40 0.00 - 13.40 %    Eosinophils 0.50 0.00 - 6.90 %    Basophils 0.50 0.00 - 1.80 %    Immature Granulocytes 0.50 0.00 - 0.90 %    Nucleated RBC 0.00 /100 WBC    Neutrophils (Absolute) 7.60 (H) 2.00 - 7.15 K/uL    Lymphs (Absolute) 2.42 1.00 - 4.80 K/uL    Monos (Absolute) 0.81 0.00 - 0.85 K/uL    Eos (Absolute) 0.05 0.00 - 0.51 K/uL    Baso (Absolute) 0.06 0.00 - 0.12 K/uL    Immature Granulocytes (abs) 0.05 0.00 - 0.11 K/uL    NRBC (Absolute) 0.00 K/uL   COMP METABOLIC PANEL   Result Value Ref Range    Sodium 135 135 - 145 mmol/L    Potassium 3.4 (L) 3.6 - 5.5 mmol/L    Chloride 99 96 - 112 mmol/L    Co2 22 20 - 33 mmol/L    Anion Gap 14.0 7.0 - 16.0    Glucose 98 65 - 99 mg/dL    Bun 10 8 - 22 mg/dL    Creatinine 0.47 (L) 0.50 - 1.40 mg/dL    Calcium 9.5 8.5 - 10.5 mg/dL    AST(SGOT) 21 12 - 45 U/L    ALT(SGPT) 21 2 - 50 U/L    Alkaline Phosphatase 75 30 - 99 U/L    Total Bilirubin 0.7 0.1 - 1.5 mg/dL    Albumin 4.1 3.2 - 4.9 g/dL    Total Protein 7.4 6.0 - 8.2 g/dL    Globulin 3.3 1.9 - 3.5 g/dL    A-G Ratio 1.2 g/dL   LIPASE   Result Value Ref Range    Lipase 21 11 - 82 U/L   HCG QUAL SERUM   Result Value Ref Range    Beta-Hcg Qualitative Serum Positive (A) Negative   URINALYSIS,CULTURE IF INDICATED    Specimen: Urine, Clean Catch   Result Value Ref Range    Color Yellow     Character Clear     Specific Gravity 1.030 <1.035    Ph 6.0 5.0 - 8.0    Glucose Negative Negative mg/dL    Ketones 80 (A) Negative mg/dL    Protein Negative Negative mg/dL    Bilirubin Negative Negative    Urobilinogen, Urine 1.0 Negative    Nitrite Negative Negative    Leukocyte Esterase Negative Negative    Occult Blood Negative Negative    Micro Urine Req see below    BETA-HCG QUANTITATIVE SERUM   Result Value Ref Range    [facility] [place] (H) 0.0 - 5.0 mIU/mL   ESTIMATED GFR   Result  Value Ref Range    GFR (CKD-EPI) 126 >60 mL/min/1.73 m 2   CORRECTED CALCIUM   Result Value Ref Range    Correct Calcium 9.4 8.5 - 10.5 mg/dL   URINE MICROSCOPIC (W/UA)   Result Value Ref Range    WBC 0-2 /hpf    RBC 0-2 /hpf    Bacteria Few (A) None /hpf    Epithelial Cells Few /hpf    Hyaline Cast 0-2 /lpf         RADIOLOGY  I have independently interpreted the diagnostic imaging associated with this visit and am waiting the final reading from the radiologist.   2:35 AM  I performed a bedside ultrasound during initial evaluation which shows an intrauterine pregnancy with fetal pole consistent with estimated gestational age based on LMP.      COURSE & MEDICAL DECISION MAKING    ED Observation Status? No; Patient does not meet criteria for ED Observation.     INITIAL ASSESSMENT AND PLAN  Care Narrative: 37-year-old female presenting with nausea and vomiting in early pregnancy.  Her vital signs are normal and she has no focal abdominal tenderness that would raise my suspicion for appendicitis, cholecystitis.  Most likely hyperemesis gravidarum.  Will check basic labs, electrolytes for dehydration.  Will check UA due to reported flank pain concern for possible UTI/pyelonephritis.  If labs are reassuring and symptoms improve no additional imaging will be required.  I did perform bedside ultrasound given early pregnancy which shows an intrauterine pregnancy, do not suspect ectopic pregnancy.  Further imaging and formal ultrasound can wait for outpatient given symptoms patient is reporting at this time.    ADDITIONAL PROBLEM LIST AND DISPOSITION    4:20 AM  Rechecked patient, she states she feels much better and has been able to tolerate oral fluids.  Labs without significant dehydration or SLOAN, feel patient can likely correct her mild dehydration with improved oral intake after medications.  IV fluids have likely helped as well.  Urine still pending, anticipate discharge once UA returns.    6:47 AM  UA with bacteria we  will treat as patient is pregnant.  She is allergic to sulfa drugs so we will treat with Keflex.  Discharged with referral for OB/GYN and antiemetics for home.    HYDRATION: Based on the patient's presentation of Acute Vomiting and Dehydration the patient was given IV fluids. IV Hydration was used because oral hydration was not adequate alone. Upon recheck following hydration, the patient was improved.        FINAL DIAGNOSIS & ED PATIENT PROBLEMS  1. Hyperemesis gravidarum    2. Urinary tract infection in mother during first trimester of pregnancy    3. Dehydration           Electronically signed by: Tabatha Cheng M.D., 1/14/2023 2:33 AM

## 2023-01-14 NOTE — ED TRIAGE NOTES
"Chief Complaint   Patient presents with    Vomiting     The pt reports vomiting since . The pt denies bloody vomit. The pt unable to keep anything down. The reports a positive home pregnancy test on . . The pt reports associated abd pain. Pain is non-radiating, 5/10, sharp    Abdominal Pain       Pt ambulatory to triage. Pt A&Ox4, for the above complaint.     Pt to lobby . Pt educated on alerting staff in changes to condition. Pt verbalized understanding. Protocol abd pain ordered.     /80   Pulse 94   Temp 36.9 °C (98.4 °F) (Temporal)   Resp 18   Ht 1.448 m (4' 9\")   Wt 46.8 kg (103 lb 2.8 oz)   LMP 2022 (Approximate)   SpO2 99% Comment: Room air  BMI 22.33 kg/m²     "

## 2023-01-14 NOTE — ED NOTES
PT AMBULATORY TO ROOM WITH A STEADY GAIT. PT STATES SHE SINCE `01/01/23 SHE HAS NOT BEEN LETA TO KEEP DOWN ANY FLUIDS OR FOOD DOWN. PT STATES SHE ALSO TOOK A PREGNANCY TEST AND IT WAS POSITIVE. PT STATES SHE FEELS LIKE SHE IS GETTING DEHYDRATED SO SHE CAME IN. PT IS AAOX4, BREATHING  IS EVEN AND UNLABORED SKIN IS WARM AND DRY, VSS, NAD, WILL CONTINUE TO MONITOR.

## 2023-01-20 ENCOUNTER — TELEPHONE (OUTPATIENT)
Dept: OBGYN | Facility: CLINIC | Age: 38
End: 2023-01-20
Payer: MEDICAID

## 2023-01-20 DIAGNOSIS — O21.0 HYPEREMESIS OF PREGNANCY: ICD-10-CM

## 2023-01-20 RX ORDER — ONDANSETRON 4 MG/1
4 TABLET, FILM COATED ORAL EVERY 4 HOURS PRN
Qty: 20 TABLET | Refills: 0 | Status: SHIPPED | OUTPATIENT
Start: 2023-01-20 | End: 2023-01-25

## 2023-01-20 NOTE — TELEPHONE ENCOUNTER
"----- Message from Yessica Hodge sent at 1/20/2023 11:21 AM PST -----  Regarding: er fv  Gilles PT  Pt returned call, doesn't know if it was for the er fv scheduling.   Pt states she is having trouble keeping food down. She states she ran out of the nausea med. Lmp 11-24. Pt states that she hasn't had any cramping or bleeding since. Pt scheduled for 01/24 w/Trupti, let me know if this is ok, or needs to be changed.     1221 called pt and stated she tried to not take zofran that was prescribed at the ER yesterday to see if she was bale to keep food down and because she only had 2 zofran left, but she was not, pt states she continues to taking the Vit B6 pyridoxine by it. Pt stated she took 1 last night and this morning she took the last one. Pt states she is not even able ot keep any fluids down and had tried \" eating cracker, everything\" to help with the vomiting but is not helping. Explained to pt this RN will consult with provider and call her back. Pt greed.     1231 Consulted with  who reviewed pt's chart and agreed to send refill for Zofran for pt and advised for pt to keep scheduled appt on 1/24/2023 in order to be able to refill meds again. .  1240 Called pt back and notified as above. Pt agreed and verbalized understanding.     "

## 2023-01-24 ENCOUNTER — HOSPITAL ENCOUNTER (OUTPATIENT)
Dept: LAB | Facility: MEDICAL CENTER | Age: 38
End: 2023-01-24
Attending: OBSTETRICS & GYNECOLOGY
Payer: MEDICAID

## 2023-01-24 ENCOUNTER — GYNECOLOGY VISIT (OUTPATIENT)
Dept: OBGYN | Facility: CLINIC | Age: 38
End: 2023-01-24
Payer: MEDICAID

## 2023-01-24 VITALS
WEIGHT: 105.2 LBS | DIASTOLIC BLOOD PRESSURE: 64 MMHG | BODY MASS INDEX: 22.7 KG/M2 | HEIGHT: 57 IN | SYSTOLIC BLOOD PRESSURE: 120 MMHG

## 2023-01-24 DIAGNOSIS — Z34.90 PREGNANCY AT EARLY STAGE: ICD-10-CM

## 2023-01-24 LAB
ABO GROUP BLD: NORMAL
BLD GP AB SCN SERPL QL: NORMAL
RH BLD: NORMAL

## 2023-01-24 PROCEDURE — 85027 COMPLETE CBC AUTOMATED: CPT

## 2023-01-24 PROCEDURE — 86900 BLOOD TYPING SEROLOGIC ABO: CPT

## 2023-01-24 PROCEDURE — 86803 HEPATITIS C AB TEST: CPT

## 2023-01-24 PROCEDURE — 99213 OFFICE O/P EST LOW 20 MIN: CPT | Mod: 25 | Performed by: OBSTETRICS & GYNECOLOGY

## 2023-01-24 PROCEDURE — 86762 RUBELLA ANTIBODY: CPT

## 2023-01-24 PROCEDURE — 87340 HEPATITIS B SURFACE AG IA: CPT

## 2023-01-24 PROCEDURE — 86850 RBC ANTIBODY SCREEN: CPT

## 2023-01-24 PROCEDURE — 76705 ECHO EXAM OF ABDOMEN: CPT | Performed by: OBSTETRICS & GYNECOLOGY

## 2023-01-24 PROCEDURE — 87086 URINE CULTURE/COLONY COUNT: CPT

## 2023-01-24 PROCEDURE — 86901 BLOOD TYPING SEROLOGIC RH(D): CPT

## 2023-01-24 PROCEDURE — 87389 HIV-1 AG W/HIV-1&-2 AB AG IA: CPT

## 2023-01-24 PROCEDURE — 36415 COLL VENOUS BLD VENIPUNCTURE: CPT

## 2023-01-24 PROCEDURE — 86780 TREPONEMA PALLIDUM: CPT

## 2023-01-24 ASSESSMENT — FIBROSIS 4 INDEX: FIB4 SCORE: 0.56

## 2023-01-24 NOTE — PROGRESS NOTES
"Isabel Galarza is a 37 y.o.  female who presents for ER follow-up where she was seen with nausea with pregnancy.      HPI: Patient was prescribed Zofran at the ER and she is doing well on it only needing it once a day.  She has no other complaints or concerns today.  Ultrasound today consistent with dates.  Vaginal cultures deferred but will do prenatal labs and urine collection.    Review of Systems:   Pertinent positives documented in HPI and all other systems reviewed & are negative.     All PMH, PSH, allergies, social history and FH reviewed and updated today:  Past Medical History:   Diagnosis Date    Ear infection     FREQ       Past Surgical History:   Procedure Laterality Date    TUBE & ECTOPIC PREG., REMOVAL      GYN SURGERY           Current Outpatient Medications:     ondansetron, 4 mg, Oral, Q4HRS PRN, Taking    pyridoxine, 50 mg, Oral, QDAY PRN, Taking    ondansetron, 4 mg, Oral, Q6HRS PRN, Taking    Prenatal MV-Min-Fe Fum-FA-DHA (PRENATAL 1 PO), Take  by mouth., Taking    ibuprofen, 600 mg, Oral, Q6HRS PRN (Patient not taking: Reported on 2023), Not Taking    Bloodless, Kiwi extract, Pineapple, and Sulfa drugs    Social History     Socioeconomic History    Marital status: Single   Tobacco Use    Smoking status: Never    Smokeless tobacco: Never   Vaping Use    Vaping Use: Never used   Substance and Sexual Activity    Alcohol use: Not Currently     Comment: socially    Drug use: Not Currently     Types: Marijuana     Comment: occ    Sexual activity: Yes     Partners: Male     Birth control/protection: None     Comment: no birth control       Family History   Problem Relation Age of Onset    No Known Problems Mother     No Known Problems Father           Objective:   Vital measurements:  /64 (BP Location: Right arm, Patient Position: Sitting, BP Cuff Size: Adult)   Ht 4' 9\"   Wt 105 lb 3.2 oz   Body mass index is 22.77 kg/m². (Goal BM I>18 <25)    Physical Exam:Physical " Exam  Constitutional:       Appearance: Normal appearance.   Eyes:      Extraocular Movements: Extraocular movements intact.      Pupils: Pupils are equal, round, and reactive to light.   Abdominal:      General: Abdomen is flat.      Palpations: Abdomen is soft.      Comments: Transabdominal ultrasound shows live intrauterine pregnancy at 8 weeks and 5 days which is consistent with her last menstrual period.  Fetal heart tones 150s.   Neurological:      Mental Status: She is alert.   Psychiatric:         Mood and Affect: Mood normal.         Behavior: Behavior normal.        Assessment:     1. Pregnancy at early stage  - URINE CULTURE(NEW); Future  - PREG CNTR PRENATAL PN; Future  - VAGINAL PATHOGENS DNA PANEL; Future  - Chlamydia/GC, PCR (Urine); Future        Plan:   Prenatal labs  New OB in 2 to 4 weeks  Patient already taking prenatal vitamins  Frequent small meals as she is doing for improved nausea, Zofran as needed  Patient and boyfriend to consider genetic testing

## 2023-01-25 LAB
ERYTHROCYTE [DISTWIDTH] IN BLOOD BY AUTOMATED COUNT: 39.5 FL (ref 35.9–50)
HBV SURFACE AG SER QL: ABNORMAL
HCT VFR BLD AUTO: 34.7 % (ref 37–47)
HCV AB SER QL: ABNORMAL
HGB BLD-MCNC: 12.3 G/DL (ref 12–16)
HIV 1+2 AB+HIV1 P24 AG SERPL QL IA: NORMAL
MCH RBC QN AUTO: 31.9 PG (ref 27–33)
MCHC RBC AUTO-ENTMCNC: 35.4 G/DL (ref 33.6–35)
MCV RBC AUTO: 90.1 FL (ref 81.4–97.8)
PLATELET # BLD AUTO: 267 K/UL (ref 164–446)
PMV BLD AUTO: 10.8 FL (ref 9–12.9)
RBC # BLD AUTO: 3.85 M/UL (ref 4.2–5.4)
RUBV AB SER QL: 110 IU/ML
T PALLIDUM AB SER QL IA: ABNORMAL
WBC # BLD AUTO: 10.2 K/UL (ref 4.8–10.8)

## 2023-01-26 LAB
BACTERIA UR CULT: NORMAL
SIGNIFICANT IND 70042: NORMAL
SITE SITE: NORMAL
SOURCE SOURCE: NORMAL

## 2023-01-30 ENCOUNTER — APPOINTMENT (OUTPATIENT)
Dept: OBGYN | Facility: CLINIC | Age: 38
End: 2023-01-30
Payer: MEDICAID

## 2023-02-03 ENCOUNTER — INITIAL PRENATAL (OUTPATIENT)
Dept: OBGYN | Facility: CLINIC | Age: 38
End: 2023-02-03
Payer: MEDICAID

## 2023-02-03 ENCOUNTER — HOSPITAL ENCOUNTER (OUTPATIENT)
Facility: MEDICAL CENTER | Age: 38
End: 2023-02-03
Attending: NURSE PRACTITIONER
Payer: MEDICAID

## 2023-02-03 VITALS — BODY MASS INDEX: 22.89 KG/M2 | DIASTOLIC BLOOD PRESSURE: 58 MMHG | WEIGHT: 105.8 LBS | SYSTOLIC BLOOD PRESSURE: 98 MMHG

## 2023-02-03 DIAGNOSIS — Z34.81 ENCOUNTER FOR SUPERVISION OF OTHER NORMAL PREGNANCY IN FIRST TRIMESTER: Primary | ICD-10-CM

## 2023-02-03 DIAGNOSIS — Z34.81 ENCOUNTER FOR SUPERVISION OF OTHER NORMAL PREGNANCY IN FIRST TRIMESTER: ICD-10-CM

## 2023-02-03 PROBLEM — Z34.91 ENCOUNTER FOR SUPERVISION OF NORMAL PREGNANCY IN FIRST TRIMESTER: Status: ACTIVE | Noted: 2023-02-03

## 2023-02-03 PROCEDURE — 87624 HPV HI-RISK TYP POOLED RSLT: CPT

## 2023-02-03 PROCEDURE — 0500F INITIAL PRENATAL CARE VISIT: CPT | Performed by: NURSE PRACTITIONER

## 2023-02-03 PROCEDURE — 88175 CYTOPATH C/V AUTO FLUID REDO: CPT

## 2023-02-03 PROCEDURE — 87591 N.GONORRHOEAE DNA AMP PROB: CPT

## 2023-02-03 PROCEDURE — 87491 CHLMYD TRACH DNA AMP PROBE: CPT

## 2023-02-03 ASSESSMENT — EDINBURGH POSTNATAL DEPRESSION SCALE (EPDS)
I HAVE BEEN ABLE TO LAUGH AND SEE THE FUNNY SIDE OF THINGS: AS MUCH AS I ALWAYS COULD
TOTAL SCORE: 0
I HAVE BEEN ANXIOUS OR WORRIED FOR NO GOOD REASON: NO, NOT AT ALL
I HAVE FELT SCARED OR PANICKY FOR NO GOOD REASON: NO, NOT AT ALL
THINGS HAVE BEEN GETTING ON TOP OF ME: NO, I HAVE BEEN COPING AS WELL AS EVER
THE THOUGHT OF HARMING MYSELF HAS OCCURRED TO ME: NEVER
I HAVE BEEN SO UNHAPPY THAT I HAVE BEEN CRYING: NO, NEVER
I HAVE FELT SAD OR MISERABLE: NO, NOT AT ALL
I HAVE BEEN SO UNHAPPY THAT I HAVE HAD DIFFICULTY SLEEPING: NOT AT ALL
I HAVE BLAMED MYSELF UNNECESSARILY WHEN THINGS WENT WRONG: NO, NEVER
I HAVE LOOKED FORWARD WITH ENJOYMENT TO THINGS: AS MUCH AS I EVER DID

## 2023-02-03 ASSESSMENT — ENCOUNTER SYMPTOMS
EYES NEGATIVE: 1
PSYCHIATRIC NEGATIVE: 1
NEUROLOGICAL NEGATIVE: 1
RESPIRATORY NEGATIVE: 1
GASTROINTESTINAL NEGATIVE: 1
CARDIOVASCULAR NEGATIVE: 1
CONSTITUTIONAL NEGATIVE: 1
MUSCULOSKELETAL NEGATIVE: 1

## 2023-02-03 ASSESSMENT — PATIENT HEALTH QUESTIONNAIRE - PHQ9: CLINICAL INTERPRETATION OF PHQ2 SCORE: 0

## 2023-02-03 ASSESSMENT — FIBROSIS 4 INDEX: FIB4 SCORE: 0.62

## 2023-02-03 NOTE — PROGRESS NOTES
Pt here for NOB apt.   Last pap : 2016 ?  Pap today  Prenatal care : None  DUB : 1/24/23 US @ 8w5d  YOLANDA : 8/31/23   Flu offered and declined.   Pt states she is still having nausea with some vomiting if she does not take the zofran.   Pt c/o getting headaches.   EPDS : 0

## 2023-02-03 NOTE — PROGRESS NOTES
Subjective     S:  Isabel Galarza is a 37 y.o.  female  @ She is Unknown with an YOLANDA of Estimated Date of Delivery: None noted. based off of LMP who presents for her new OB exam.      Her OB hx includes NVSD x1, followed by ectopic in , then  x4 SAB x 2, no D&C needed, and last  in 2017  History of HSV I or II in self or partner: no  History of STIs in self or partner: no  History of Thyroid problems: no    No ER visits or previous care in this pregnancy. She reports headache.  Too early AFP.  Declines NIPT. Declines MFM referral Declines CF.  Reports no FM, VB, LOF, or cramping.  Denies dysuria, vaginal DC.  Pt is single and lives with children. FOB is involved. 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  Pregnancy is unplanned, desires long term BCM after delivery.      O:    Vitals:    23 1246   BP: 98/58   Weight: 105 lb 12.8 oz    See H&P Prenatal Physical.  Wet mount: not indicated        FHTs: 158        Fundal ht: 11cm     A:   1.  IUP @ Unknown YOLANDA: Estimated Date of Delivery: None noted. per LMP         2.  S=D        3.    Patient Active Problem List    Diagnosis Date Noted    Encounter for supervision of normal pregnancy in first trimester 2023    Pregnancy at early stage 2023    Encounter for supervision of normal pregnancy in multigravida, antepartum 2016         P:  1.  GC/CT today. Pap today.         2.  Prenatal labs and UDS ordered - lab slip given        3.  Discussed diet and exercise during pregnancy. Encouraged good nutrition, and daily exercise including walking or swimming. Discussed expected weight gain during pregnancy.              4.  Discussed adequate hydration during pregnancy.        5.  NOB packet given        6.  Return to office in 4 wks        7.  Complete OB US in 9-10 wks        8.  Pregnancy guide provided        9.  Early 1 hr GTT       10. Declines NIPT and MFM referral for  AMA      HPI    Review of Systems   Constitutional: Negative.    HENT: Negative.     Eyes: Negative.    Respiratory: Negative.     Cardiovascular: Negative.    Gastrointestinal: Negative.    Genitourinary: Negative.    Musculoskeletal: Negative.    Skin: Negative.    Neurological: Negative.    Endo/Heme/Allergies: Negative.    Psychiatric/Behavioral: Negative.              Objective     BP 98/58   Wt 105 lb 12.8 oz   LMP 11/24/2022 (Approximate)   BMI 22.89 kg/m²      Physical Exam  Vitals and nursing note reviewed.   Constitutional:       Appearance: She is well-developed.   Cardiovascular:      Rate and Rhythm: Normal rate and regular rhythm.      Heart sounds: Normal heart sounds.   Pulmonary:      Effort: Pulmonary effort is normal.      Breath sounds: Normal breath sounds.   Abdominal:      Palpations: Abdomen is soft.   Genitourinary:     Vagina: Normal.      Comments: Uterus enlarged, c/w 11 wk ga  Musculoskeletal:         General: Normal range of motion.      Cervical back: Normal range of motion and neck supple.   Skin:     General: Skin is warm and dry.   Neurological:      Mental Status: She is alert and oriented to person, place, and time.      Deep Tendon Reflexes: Reflexes are normal and symmetric.   Psychiatric:         Behavior: Behavior normal.         Thought Content: Thought content normal.         Judgment: Judgment normal.      Comments: EPDS = 0                           Assessment & Plan        1. Encounter for supervision of other normal pregnancy in first trimester    - US-OB 2ND 3RD TRI COMPLETE; Future  - URINE DRUG SCREEN W/CONF (AR); Future  - THINPREP RFLX HPV ASCUS W/CTNG; Future  - GLUCOSE 1HR GESTATIONAL; Future

## 2023-02-06 LAB
C TRACH DNA GENITAL QL NAA+PROBE: NEGATIVE
CYTOLOGY REG CYTOL: NORMAL
N GONORRHOEA DNA GENITAL QL NAA+PROBE: NEGATIVE
SPECIMEN SOURCE: NORMAL

## 2023-02-08 LAB
HPV HR 12 DNA CVX QL NAA+PROBE: POSITIVE
HPV16 DNA SPEC QL NAA+PROBE: NEGATIVE
HPV18 DNA SPEC QL NAA+PROBE: POSITIVE
SPECIMEN SOURCE: ABNORMAL

## 2023-02-09 ENCOUNTER — TELEPHONE (OUTPATIENT)
Dept: OBGYN | Facility: CLINIC | Age: 38
End: 2023-02-09
Payer: MEDICAID

## 2023-02-09 DIAGNOSIS — R87.810 ASCUS WITH POSITIVE HIGH RISK HPV CERVICAL: ICD-10-CM

## 2023-02-09 DIAGNOSIS — R87.610 ASCUS WITH POSITIVE HIGH RISK HPV CERVICAL: ICD-10-CM

## 2023-02-09 NOTE — TELEPHONE ENCOUNTER
----- Message from PATRIA Morgan sent at 2/9/2023  8:13 AM PST -----  ASCUS with positive HPV-gyn referral for colpo sent      02/09/23  1048 Left message for pt to call back regarding pap results.   02/14/23  1316 Left message for pt to call back regarding pap results.   02/16/23  1401 Left message for pt to call back regarding pap results.   [employer] message sent for pt to contact our office.   02/17/23  Pt has not seen [employer] message. Letter sent today for pt to contact our office. MAURAI placed in chart

## 2023-02-09 NOTE — LETTER
February 17, 2023          Dear Isabel Galarza,      Please call us regarding your care.  One of the nurses is available to speak with you Monday through Friday, 8 a.m. to 4:30 p.m.    Please call us at [phone]; thank you for your prompt attention.        Sincerely,    Annie Shanks R.N.    Electronically Signed

## 2023-03-03 ENCOUNTER — HOSPITAL ENCOUNTER (OUTPATIENT)
Dept: LAB | Facility: MEDICAL CENTER | Age: 38
End: 2023-03-03
Attending: NURSE PRACTITIONER
Payer: MEDICAID

## 2023-03-03 ENCOUNTER — ROUTINE PRENATAL (OUTPATIENT)
Dept: OBGYN | Facility: CLINIC | Age: 38
End: 2023-03-03
Payer: MEDICAID

## 2023-03-03 VITALS — WEIGHT: 108 LBS | DIASTOLIC BLOOD PRESSURE: 50 MMHG | SYSTOLIC BLOOD PRESSURE: 90 MMHG | BODY MASS INDEX: 23.37 KG/M2

## 2023-03-03 DIAGNOSIS — Z64.1 GRAND MULTIPARITY: ICD-10-CM

## 2023-03-03 DIAGNOSIS — Z34.82 ENCOUNTER FOR SUPERVISION OF OTHER NORMAL PREGNANCY IN SECOND TRIMESTER: ICD-10-CM

## 2023-03-03 DIAGNOSIS — Z34.81 ENCOUNTER FOR SUPERVISION OF OTHER NORMAL PREGNANCY IN FIRST TRIMESTER: ICD-10-CM

## 2023-03-03 PROBLEM — Z34.90 PREGNANCY AT EARLY STAGE: Status: RESOLVED | Noted: 2023-01-24 | Resolved: 2023-03-03

## 2023-03-03 LAB — GLUCOSE 1H P 50 G GLC PO SERPL-MCNC: 89 MG/DL (ref 70–139)

## 2023-03-03 PROCEDURE — 0502F SUBSEQUENT PRENATAL CARE: CPT | Performed by: ADVANCED PRACTICE MIDWIFE

## 2023-03-03 PROCEDURE — 82950 GLUCOSE TEST: CPT

## 2023-03-03 PROCEDURE — 36415 COLL VENOUS BLD VENIPUNCTURE: CPT

## 2023-03-03 PROCEDURE — 80307 DRUG TEST PRSMV CHEM ANLYZR: CPT

## 2023-03-03 ASSESSMENT — FIBROSIS 4 INDEX: FIB4 SCORE: 0.62

## 2023-03-03 NOTE — PROGRESS NOTES
Pt here today for OB follow up  Pt states no complaints   Reports +  Good # [phone]  Pharmacy Confirmed.  Chaperone offered and not indicated.   Pt has u/s scheduled on 4/21/2023

## 2023-03-03 NOTE — PROGRESS NOTES
Patient here for NIKOLAS visit at 14w1d of pregnancy. She denies fetal movement, denies vaginal bleeding or cramping/regular contractions. She reports overall today she is feeling well and without complaints. No recent ER visits since last seen. Reviewed labs with patient.  Adequate hydration reviewed in detail with patient. Precautions and warning signs reviewed with patient.  RTC in 4 week(s) for NIKOLAS visit.       ASCUS/ +HPV  Discussed with patient and she desires colposcopy during pregnancy. Appointment made with female provider. Did educate her on process and she voices understanding.

## 2023-03-05 LAB
AMPHET CTO UR CFM-MCNC: NEGATIVE NG/ML
BARBITURATES CTO UR CFM-MCNC: NEGATIVE NG/ML
BENZODIAZ CTO UR CFM-MCNC: NEGATIVE NG/ML
CANNABINOIDS CTO UR CFM-MCNC: POSITIVE NG/ML
COCAINE CTO UR CFM-MCNC: NEGATIVE NG/ML
DRUG COMMENT 753798: NORMAL
METHADONE CTO UR CFM-MCNC: NEGATIVE NG/ML
OPIATES CTO UR CFM-MCNC: NEGATIVE NG/ML
PCP CTO UR CFM-MCNC: NEGATIVE NG/ML
PROPOXYPH CTO UR CFM-MCNC: NEGATIVE NG/ML

## 2023-03-08 LAB — THC UR CFM-MCNC: <15 NG/ML

## 2023-03-30 ENCOUNTER — ROUTINE PRENATAL (OUTPATIENT)
Dept: OBGYN | Facility: CLINIC | Age: 38
End: 2023-03-30
Payer: MEDICAID

## 2023-03-30 VITALS — BODY MASS INDEX: 24.45 KG/M2 | SYSTOLIC BLOOD PRESSURE: 92 MMHG | DIASTOLIC BLOOD PRESSURE: 50 MMHG | WEIGHT: 113 LBS

## 2023-03-30 DIAGNOSIS — O09.522 AMA (ADVANCED MATERNAL AGE) MULTIGRAVIDA 35+, SECOND TRIMESTER: ICD-10-CM

## 2023-03-30 DIAGNOSIS — Z34.82 ENCOUNTER FOR SUPERVISION OF OTHER NORMAL PREGNANCY IN SECOND TRIMESTER: ICD-10-CM

## 2023-03-30 PROBLEM — Z34.91 ENCOUNTER FOR SUPERVISION OF NORMAL PREGNANCY IN FIRST TRIMESTER: Status: RESOLVED | Noted: 2023-02-03 | Resolved: 2023-03-30

## 2023-03-30 PROCEDURE — 0502F SUBSEQUENT PRENATAL CARE: CPT | Performed by: ADVANCED PRACTICE MIDWIFE

## 2023-03-30 ASSESSMENT — FIBROSIS 4 INDEX: FIB4 SCORE: 0.62

## 2023-03-30 NOTE — PROGRESS NOTES
Pt. Here for OB/FU. Reports -FM.   Pt. Denies VB, LOF, or UC's.   Pt states she has no concerns today.   Pt missed her Colpo apt but plans to reschedule that today.   US scheduled for 4/21.

## 2023-03-30 NOTE — PROGRESS NOTES
Patient here for NIKOLAS visit at 18w0d of pregnancy. She affirms fetal movement, denies vaginal bleeding or cramping/regular contractions. She reports overall today she is feeling well and without complaints. No recent ER visits since last seen. She missed her colpo appointment but will reschedule. She is considering that she does not desire any additional pregnancies but does not want a BTL. Consider Paragard for birth control method.  Adequate hydration reviewed in detail with patient. Precautions and warning signs reviewed with patient.  RTC in 4 week(s) for NIKOLAS visit.

## 2023-04-21 ENCOUNTER — APPOINTMENT (OUTPATIENT)
Dept: RADIOLOGY | Facility: IMAGING CENTER | Age: 38
End: 2023-04-21
Attending: NURSE PRACTITIONER
Payer: MEDICAID

## 2023-04-21 DIAGNOSIS — Z34.81 ENCOUNTER FOR SUPERVISION OF OTHER NORMAL PREGNANCY IN FIRST TRIMESTER: ICD-10-CM

## 2023-04-21 PROCEDURE — 76805 OB US >/= 14 WKS SNGL FETUS: CPT | Mod: TC | Performed by: NURSE PRACTITIONER

## 2023-04-24 ENCOUNTER — TELEPHONE (OUTPATIENT)
Dept: OBGYN | Facility: CLINIC | Age: 38
End: 2023-04-24

## 2023-04-24 NOTE — TELEPHONE ENCOUNTER
Caller Name: Isabel Galarza  Call Back Number: [phone] (home)       How would the patient prefer to be contacted with a response: Phone call OK to leave a detailed message    Pt states that she is currently having lower abdominal pressure,pelvic and back pain x 1 week. Pt states that she has no discharge, vaginal bleeding, and urine is ok. Due to Pt's medical Hx she would like to know if this is normal or should she be worried. Please advise.

## 2023-04-25 ENCOUNTER — ROUTINE PRENATAL (OUTPATIENT)
Dept: OBGYN | Facility: CLINIC | Age: 38
End: 2023-04-25
Payer: MEDICAID

## 2023-04-25 ENCOUNTER — HOSPITAL ENCOUNTER (OUTPATIENT)
Dept: LAB | Facility: MEDICAL CENTER | Age: 38
End: 2023-04-25
Payer: MEDICAID

## 2023-04-25 VITALS — WEIGHT: 116.2 LBS | SYSTOLIC BLOOD PRESSURE: 94 MMHG | BODY MASS INDEX: 25.15 KG/M2 | DIASTOLIC BLOOD PRESSURE: 50 MMHG

## 2023-04-25 DIAGNOSIS — Z34.80 ENCOUNTER FOR SUPERVISION OF NORMAL PREGNANCY IN MULTIGRAVIDA, ANTEPARTUM: ICD-10-CM

## 2023-04-25 DIAGNOSIS — Z64.1 GRAND MULTIPARITY: ICD-10-CM

## 2023-04-25 DIAGNOSIS — R52 PAIN: ICD-10-CM

## 2023-04-25 PROCEDURE — 0502F SUBSEQUENT PRENATAL CARE: CPT

## 2023-04-25 PROCEDURE — 87086 URINE CULTURE/COLONY COUNT: CPT

## 2023-04-25 ASSESSMENT — FIBROSIS 4 INDEX: FIB4 SCORE: 0.62

## 2023-04-25 NOTE — LETTER
April 25, 2023            Isabel Galarza is pregnant with an estimated delivery date of 8/31/23 at this time. She is receiving care in our office. Please excuse her from work for 4/24/23.         Thank you,          Nathaly Schaefer C.N.M.    Electronically Signed

## 2023-04-25 NOTE — PROGRESS NOTES
Pt here today for OB follow up  Reports +FM  WT: 116.2 lb  BP: 94/50  Preferred pharmacy verified with pt.  MFM Appointment: not at this time   Pt states has been having lower side pains on her abdomen and pelvic pressure. States no other complaints or concerns today. In additions pt reports she sometimes feels out of breath. Denies vaginal bleeding, LOF or UC's   Good # [phone]  Pt previously declined genetic testing    Pt states has been notified about pap results and has appointment for Colposcopy on 05/03/2023

## 2023-04-25 NOTE — PROGRESS NOTES
S:  Isabel here for routine prenatal follow up.  Reports good FM.  Denies VB, LOF, RUCs, or vaginal DC.  Had some pelvic pain and pain in her sides yesterday that has resolved today. She was worried about it feeling like a kidney infection (she has had this outside of pregnancy before) due to it wrapping to her back in the CVA area. No pain today.     O:    Vitals:    04/25/23 1106   BP: 94/50   Weight: 116 lb 3.2 oz     CVA tenderness: negative  See flow sheet    Complete OB US  4/21/2023 12:31 PM     HISTORY/REASON FOR EXAM:  Evaluate fetal anatomy.     TECHNIQUE/EXAM DESCRIPTION: OB complete ultrasound.     COMPARISON:  None     FINDINGS:  Fetal Lie:  Vertex  LMP:  11/24/2022  Clinical YOLANDA by LMP:  8/31/2023     Placenta (Location):  Posterior  Placenta Previa: No  Placental ndGndrndanddndend:nd nd2nd Amniotic Fluid Volume:  YAMILKA = 10.6 cm     Fetal Heart Rate:  146 bpm     Cervical Length:  3.7 cm transabdominal     No maternal adnexal mass is identified.     Fetal Anatomy  (Seen or Not Seen)  Lateral Ventricles     Seen  Cisterna Magna        Seen  Cerebellum              Seen  CSP             Seen  Orbits             Seen  Face/Lips                Seen  Cord Insertion         Seen  Placental CI         Seen  4 Chamber Heart     Seen  LVOT               Seen  RVOT              Seen  3 Vessel View     Seen  Stomach       Seen  Kidneys                   Seen  Urinary Bladder      Seen  Spine                       Seen  3 Vessel Cord          Seen  Both Upper Extremities    Seen  Both Lower Extremities    Seen  Diaphragm             Seen  Movement       Seen  Gender:  Likely female     Fetal Biometry  BPD    5.1 cm, 21 weeks 2 days, (56%)  HC    19.2 cm, 21 weeks 3 days, (55%)  AC    16.1 cm, 21 weeks 2 days, (45%)  Femur Length    3.7 cm, 21 weeks 5 days, (60%)  Humerus Length    3.5 cm, 21 weeks 6 days, (71%)  Cerebellum Diameter   2.20 cm, 20 weeks 4 days, (46.2%)     EGA by this US:  21 weeks 3 days  YOLANDA by this US:  8/29/2023  YOLANDA by 1st US:  Not applicable     Estimated Fetal Weight:  423 grams  EFW Percentile: 59%     Comments:     IMPRESSION:     Single intrauterine pregnancy of an estimated gestational age of 21 weeks 3 days with an estimated date of delivery of 8/29/2023.     Fetal survey within normal limits.    A:    IUP 21w5d  S=D, FH at U+1  Patient Active Problem List    Diagnosis Date Noted    Grand multiparity 03/03/2023    Encounter for supervision of normal pregnancy in multigravida, antepartum 09/16/2016        P:  1.  Reviewed labs, ultrasound w pt.  Expecting XX        2.  Questions answered.          3.  Urine culture as a precaution. Will collect today. RTC for pain recurrence. Encouraged adequate water intake        4.  Anticipatory guidance        5.  F/u 4 wks and PRN  Orders Placed This Encounter    URINE CULTURE      Nathaly Schaefer C.N.M.

## 2023-04-27 LAB
BACTERIA UR CULT: NORMAL
SIGNIFICANT IND 70042: NORMAL
SITE SITE: NORMAL
SOURCE SOURCE: NORMAL

## 2023-05-03 ENCOUNTER — GYNECOLOGY VISIT (OUTPATIENT)
Dept: OBGYN | Facility: CLINIC | Age: 38
End: 2023-05-03
Payer: MEDICAID

## 2023-05-03 VITALS
SYSTOLIC BLOOD PRESSURE: 94 MMHG | DIASTOLIC BLOOD PRESSURE: 50 MMHG | HEIGHT: 57 IN | BODY MASS INDEX: 25.24 KG/M2 | WEIGHT: 117 LBS

## 2023-05-03 DIAGNOSIS — R87.811 ASCUS WITH POSITIVE HIGH RISK HUMAN PAPILLOMAVIRUS OF VAGINA: Primary | ICD-10-CM

## 2023-05-03 DIAGNOSIS — R87.620 ASCUS WITH POSITIVE HIGH RISK HUMAN PAPILLOMAVIRUS OF VAGINA: Primary | ICD-10-CM

## 2023-05-03 PROCEDURE — 57425 LAPAROSCOPY SURG COLPOPEXY: CPT | Performed by: OBSTETRICS & GYNECOLOGY

## 2023-05-03 ASSESSMENT — FIBROSIS 4 INDEX: FIB4 SCORE: 0.64

## 2023-05-03 NOTE — PROCEDURES
"  Procedures    COLPOSCOPY PROCEDURE NOTE:  Isabel Galarza is a 38 y.o. female presents today for colposcopy evaluation for history of ASCUS, high risk HPV type 18 result.     BP 94/50 (BP Location: Right arm, Patient Position: Sitting, BP Cuff Size: Adult)   Ht 4' 9\"   Wt 117 lb   LMP 11/24/2022 (Approximate)   BMI 25.32 kg/m²     PROCEDURE:  Indication: ASCUS, high risk HPV type 18. Pap in 2016 was ASCUS high risk HPV, but she did not follow up.   Adequacy: colposcopy adequate.  SCJ Visibility: Completely visible  Procedure: SCJ visualized, aceto white changes seen mostly at 6 o'clock, might be high grade  Findings: possibly LIZ 2    ASSESSMENT:   possibly LIZ 2, no biopsy taken today because she is pregnant, recommend repeat colposcopy after delivery    PLAN:   Recommend repeat colposcopy after delivery    Doretha Benitez M.D.     "

## 2023-05-08 ENCOUNTER — TELEPHONE (OUTPATIENT)
Dept: OBGYN | Facility: CLINIC | Age: 38
End: 2023-05-08

## 2023-05-09 ENCOUNTER — TELEPHONE (OUTPATIENT)
Dept: OBGYN | Facility: CLINIC | Age: 38
End: 2023-05-09

## 2023-05-09 NOTE — TELEPHONE ENCOUNTER
Patient called in following encounter made 5/8/23 by Laura. She states she is experiencing on and off sharp pains in her left and ride side of abdomen, she has not tried any tylenol or compression for the discomfort states she prefers not to take medication during pregnancy. Denies vaginal bleeding, LOF, discharge, cramping, fever, or chills.  Patient states she brought this up to Dr. Benitez last visit which she stated could be normal with baby growing but patient is still concerned. Encounter routed to Dr. Benitez and MILDRED Huber to advise.

## 2023-05-15 ENCOUNTER — TELEPHONE (OUTPATIENT)
Dept: OBGYN | Facility: CLINIC | Age: 38
End: 2023-05-15
Payer: MEDICAID

## 2023-06-09 PROBLEM — R87.620 ASCUS WITH POSITIVE HIGH RISK HUMAN PAPILLOMAVIRUS OF VAGINA: Status: ACTIVE | Noted: 2023-06-09

## 2023-06-09 PROBLEM — R87.811 ASCUS WITH POSITIVE HIGH RISK HUMAN PAPILLOMAVIRUS OF VAGINA: Status: ACTIVE | Noted: 2023-06-09

## 2023-06-12 ENCOUNTER — ROUTINE PRENATAL (OUTPATIENT)
Dept: OBGYN | Facility: CLINIC | Age: 38
End: 2023-06-12
Payer: MEDICAID

## 2023-06-12 VITALS — SYSTOLIC BLOOD PRESSURE: 90 MMHG | WEIGHT: 125 LBS | DIASTOLIC BLOOD PRESSURE: 50 MMHG | BODY MASS INDEX: 27.05 KG/M2

## 2023-06-12 DIAGNOSIS — Z34.83 ENCOUNTER FOR SUPERVISION OF OTHER NORMAL PREGNANCY IN THIRD TRIMESTER: Primary | ICD-10-CM

## 2023-06-12 DIAGNOSIS — Z34.82 ENCOUNTER FOR SUPERVISION OF OTHER NORMAL PREGNANCY IN SECOND TRIMESTER: ICD-10-CM

## 2023-06-12 PROCEDURE — 3074F SYST BP LT 130 MM HG: CPT | Performed by: NURSE PRACTITIONER

## 2023-06-12 PROCEDURE — 0502F SUBSEQUENT PRENATAL CARE: CPT | Performed by: NURSE PRACTITIONER

## 2023-06-12 PROCEDURE — 3078F DIAST BP <80 MM HG: CPT | Performed by: NURSE PRACTITIONER

## 2023-06-12 ASSESSMENT — FIBROSIS 4 INDEX: FIB4 SCORE: 0.64

## 2023-06-12 NOTE — PROGRESS NOTES
Pt here today for OB follow up  Pt states no VB, LOF, Uc's, pt states she is feeling fine. -  Good # [phone]  Pharmacy Confirmed.  Chaperone offered and declined .   Kick count given today with instructions   T-dap declined

## 2023-06-12 NOTE — PROGRESS NOTES
S:  Pt is  at 28w4d for routine OB follow up. She does not have any copmlaints. No ED or hospital visits since last seen. Reports good FM.  Denies VB, LOF, RUCs or vaginal DC.    O:  Please see above vitals.                  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  Declines BTL.        2.  Instructions given on FKCs.          3.  Questions answered.          4.  Encouraged pt to tour L&D.          5.  Encourage adequate water intake.        6.   labor precautions reviewed.         7.  F/u 2 wks.        8.  TDap declined.        9.  US for growth d/t AMA ordered

## 2023-06-12 NOTE — LETTER
"Count Your Baby's Movements  Another step to a healthy delivery    Isabel Galarza             Dept: [phone]    How Many Weeks Pregnant= 28w4d    Date to Begin Countin/15/23              How to use this chart    One way for your physician to keep track of your baby's health is by knowing how often the baby moves (or \"kicks\") in your womb.  You can help your physician to do this by using this chart every day.    Every day, you should see how many hours it takes for your baby to move 10 times.  Start in the morning, as soon as you get up.    First, write down the time your baby moves until you get to 10.  Check off one box every time your baby moves until you get to 10.  Write down the time you finished counting in the last column.  Total how long it took to count up all 10 movements.  Finally, fill in the box that shows how long this took.  After counting 10 movements, you no longer have to count any more that day.  The next morning, just start counting again as soon as you get up.    What should you call a \"movement\"?  It is hard to say, because it will feel different from one mother to another and from one pregnancy to the next.  The important thing is that you count the movements the same way throughout your pregnancy.  If you have more questions, you should ask your physician.    Count carefully every day!  SAMPLE:  Week 28    How many hours did it take to feel 10 movements?       Start  Time     1     2     3     4     5     6     7     8     9     10   Finish Time   Mon 8:20           11:40                  Fri               Sat               Sun                 IMPORTANT: You should contact your physician if it takes more than two hours for you to feel 10 movements.  Each morning, write down the time and start to count the movements of your baby.  Keep track by checking off one box every time you feel one movement.  When you have felt 10 \"kicks\", write down the " time you finished counting in the last column.  Then fill in the   box (over the check robb) for the number of hours it took.  Be sure to read the complete instructions on the previous page.

## 2023-07-03 ENCOUNTER — ROUTINE PRENATAL (OUTPATIENT)
Dept: OBGYN | Facility: CLINIC | Age: 38
End: 2023-07-03
Payer: MEDICAID

## 2023-07-03 VITALS — SYSTOLIC BLOOD PRESSURE: 90 MMHG | WEIGHT: 124 LBS | BODY MASS INDEX: 26.83 KG/M2 | DIASTOLIC BLOOD PRESSURE: 52 MMHG

## 2023-07-03 DIAGNOSIS — O09.523 ADVANCED MATERNAL AGE IN MULTIGRAVIDA, THIRD TRIMESTER: Primary | ICD-10-CM

## 2023-07-03 PROCEDURE — 3074F SYST BP LT 130 MM HG: CPT | Performed by: NURSE PRACTITIONER

## 2023-07-03 PROCEDURE — 3078F DIAST BP <80 MM HG: CPT | Performed by: NURSE PRACTITIONER

## 2023-07-03 PROCEDURE — 0502F SUBSEQUENT PRENATAL CARE: CPT | Performed by: NURSE PRACTITIONER

## 2023-07-03 ASSESSMENT — FIBROSIS 4 INDEX: FIB4 SCORE: 0.64

## 2023-07-03 NOTE — PROGRESS NOTES
OB follow up   + fetal movement.  No VB, LOF  Pt states she feels like she has had UC'S   Phone # [phone]  Preferred pharmacy confirmed.  US 7/10/23

## 2023-07-05 ENCOUNTER — TELEPHONE (OUTPATIENT)
Dept: OBGYN | Facility: CLINIC | Age: 38
End: 2023-07-05

## 2023-07-05 NOTE — TELEPHONE ENCOUNTER
Called pt to ask if she was having problems with the lab to get her glucose test done. I was given a paper from Josselin and she stated that the patient needed and second 1 hr gtt but the lab said they already have the result but there is no second one completed. After talking to Rossy DURAN, I needed to call the pt to see if she was having problems from the lab or if she has gone to get it done at all. There was no answer and it said they can not be reached at the time and the mailbox is full

## 2023-07-10 ENCOUNTER — APPOINTMENT (OUTPATIENT)
Dept: RADIOLOGY | Facility: IMAGING CENTER | Age: 38
End: 2023-07-10
Attending: NURSE PRACTITIONER
Payer: MEDICAID

## 2023-07-10 DIAGNOSIS — Z34.82 ENCOUNTER FOR SUPERVISION OF OTHER NORMAL PREGNANCY IN SECOND TRIMESTER: ICD-10-CM

## 2023-07-10 PROCEDURE — 76816 OB US FOLLOW-UP PER FETUS: CPT | Mod: TC | Performed by: NURSE PRACTITIONER

## 2023-07-25 ENCOUNTER — ROUTINE PRENATAL (OUTPATIENT)
Dept: OBGYN | Facility: CLINIC | Age: 38
End: 2023-07-25
Payer: MEDICAID

## 2023-07-25 VITALS — DIASTOLIC BLOOD PRESSURE: 62 MMHG | BODY MASS INDEX: 27.48 KG/M2 | SYSTOLIC BLOOD PRESSURE: 110 MMHG | WEIGHT: 127 LBS

## 2023-07-25 DIAGNOSIS — O09.523 ADVANCED MATERNAL AGE IN MULTIGRAVIDA, THIRD TRIMESTER: Primary | ICD-10-CM

## 2023-07-25 PROCEDURE — 3074F SYST BP LT 130 MM HG: CPT | Performed by: NURSE PRACTITIONER

## 2023-07-25 PROCEDURE — 0502F SUBSEQUENT PRENATAL CARE: CPT | Performed by: NURSE PRACTITIONER

## 2023-07-25 PROCEDURE — 3078F DIAST BP <80 MM HG: CPT | Performed by: NURSE PRACTITIONER

## 2023-07-25 ASSESSMENT — FIBROSIS 4 INDEX: FIB4 SCORE: 0.64

## 2023-07-25 NOTE — PROGRESS NOTES
S:  Pt is  at 34w5d for routine OB follow up.  Pt c/o occasional pelvic pressure but denies pain or cramping. No ED or hospital visits since last seen. Reports good FM.  Denies VB, LOF, RUCs or vaginal DC.    O:  Please see above vitals.          A:  IUP at 34w5d  Patient Active Problem List    Diagnosis Date Noted    Advanced maternal age in multigravida, third trimester 2023    ASCUS with positive high risk human papillomavirus of vagina 2023    Grand multiparity 2023    Encounter for supervision of normal pregnancy in multigravida, antepartum 2016        P:  1.  GBS @ 36 wks.          2.  Continue FKCs.          3.  Questions answered.          4.  Encouraged pt to tour L&D.          5.  Encourage adequate water intake.        6. Weekly NSTs starting at 36 weeks        7. Discussed IOL at 39 weeks for AMA        8.  F/u 2 wks.

## 2023-07-25 NOTE — PROGRESS NOTES
OB follow up   + fetal movement.  No VB, LOF or UC's.  Phone # [phone]  Preferred pharmacy confirmed.  3rd trimester labs not done yet. Orders reprinted and given to patient  BTL declined

## 2023-08-01 ENCOUNTER — ROUTINE PRENATAL (OUTPATIENT)
Dept: OBGYN | Facility: CLINIC | Age: 38
End: 2023-08-01
Payer: MEDICAID

## 2023-08-01 ENCOUNTER — HOSPITAL ENCOUNTER (OUTPATIENT)
Dept: LAB | Facility: MEDICAL CENTER | Age: 38
End: 2023-08-01
Attending: NURSE PRACTITIONER
Payer: MEDICAID

## 2023-08-01 VITALS — SYSTOLIC BLOOD PRESSURE: 94 MMHG | WEIGHT: 129 LBS | DIASTOLIC BLOOD PRESSURE: 50 MMHG | BODY MASS INDEX: 27.92 KG/M2

## 2023-08-01 DIAGNOSIS — O09.523 ADVANCED MATERNAL AGE IN MULTIGRAVIDA, THIRD TRIMESTER: ICD-10-CM

## 2023-08-01 DIAGNOSIS — Z34.83 ENCOUNTER FOR SUPERVISION OF OTHER NORMAL PREGNANCY IN THIRD TRIMESTER: ICD-10-CM

## 2023-08-01 DIAGNOSIS — Z34.82 ENCOUNTER FOR SUPERVISION OF OTHER NORMAL PREGNANCY IN SECOND TRIMESTER: ICD-10-CM

## 2023-08-01 LAB
ERYTHROCYTE [DISTWIDTH] IN BLOOD BY AUTOMATED COUNT: 47.9 FL (ref 35.9–50)
GLUCOSE 1H P 50 G GLC PO SERPL-MCNC: 131 MG/DL (ref 70–139)
HCT VFR BLD AUTO: 36.2 % (ref 37–47)
HGB BLD-MCNC: 12.1 G/DL (ref 12–16)
MCH RBC QN AUTO: 31.7 PG (ref 27–33)
MCHC RBC AUTO-ENTMCNC: 33.4 G/DL (ref 32.2–35.5)
MCV RBC AUTO: 94.8 FL (ref 81.4–97.8)
PLATELET # BLD AUTO: 221 K/UL (ref 164–446)
PMV BLD AUTO: 11.1 FL (ref 9–12.9)
RBC # BLD AUTO: 3.82 M/UL (ref 4.2–5.4)
T PALLIDUM AB SER QL IA: NORMAL
WBC # BLD AUTO: 9.5 K/UL (ref 4.8–10.8)

## 2023-08-01 PROCEDURE — 36415 COLL VENOUS BLD VENIPUNCTURE: CPT

## 2023-08-01 PROCEDURE — 85027 COMPLETE CBC AUTOMATED: CPT

## 2023-08-01 PROCEDURE — 0502F SUBSEQUENT PRENATAL CARE: CPT | Performed by: NURSE PRACTITIONER

## 2023-08-01 PROCEDURE — 86780 TREPONEMA PALLIDUM: CPT

## 2023-08-01 PROCEDURE — 3078F DIAST BP <80 MM HG: CPT | Performed by: NURSE PRACTITIONER

## 2023-08-01 PROCEDURE — 82950 GLUCOSE TEST: CPT

## 2023-08-01 PROCEDURE — 3074F SYST BP LT 130 MM HG: CPT | Performed by: NURSE PRACTITIONER

## 2023-08-01 ASSESSMENT — FIBROSIS 4 INDEX: FIB4 SCORE: 0.64

## 2023-08-01 NOTE — PROGRESS NOTES
SUBJECTIVE:  Pt is a 38 y.o.   at 35w5d  gestation. Presents today for follow-up prenatal care. Reports good  fetal movement. Denies regular cramping/contractions, bleeding or leaking of fluid. Denies dysuria, headaches, N/V. Generally feels well today.     OBJECTIVE:  - See prenatal vitals flow  -   Vitals:    23 1120   BP: 94/50   Weight: 129 lb                 ASSESSMENT:   - IUP at 35w5d    - S=D   -   Patient Active Problem List    Diagnosis Date Noted    Advanced maternal age in multigravida, third trimester 2023    ASCUS with positive high risk human papillomavirus of vagina 2023    Grand multiparity 2023         PLAN:  - S/sx pregnancy and labor warning signs vs general discomforts discussed  - Fetal movements and/or kick counts reviewed   - Adequate hydration reinforced  - Nutrition/exercise/vitamin education; continue PNV  - GCT pending   - Plans unsure for contraception Pp  - NST starting next week for AMA  - Anticipatory guidance given  - RTC in 1 weeks for follow-up prenatal care

## 2023-08-01 NOTE — PROGRESS NOTES
Pt. Here for OB/FU.   Reports Good FM.   Pt. Denies VB, LOF, or UC's.   Chaperone offered and indicated.   3rd tri labs done this AM.   Pt states she has no concerns.

## 2023-08-07 ENCOUNTER — NON-PROVIDER VISIT (OUTPATIENT)
Dept: OBGYN | Facility: CLINIC | Age: 38
End: 2023-08-07
Payer: MEDICAID

## 2023-08-07 ENCOUNTER — HOSPITAL ENCOUNTER (OUTPATIENT)
Facility: MEDICAL CENTER | Age: 38
End: 2023-08-07
Attending: NURSE PRACTITIONER
Payer: MEDICAID

## 2023-08-07 ENCOUNTER — ROUTINE PRENATAL (OUTPATIENT)
Dept: OBGYN | Facility: CLINIC | Age: 38
End: 2023-08-07
Payer: MEDICAID

## 2023-08-07 VITALS — WEIGHT: 130.4 LBS | BODY MASS INDEX: 28.22 KG/M2 | DIASTOLIC BLOOD PRESSURE: 67 MMHG | SYSTOLIC BLOOD PRESSURE: 102 MMHG

## 2023-08-07 DIAGNOSIS — O09.523 ADVANCED MATERNAL AGE IN MULTIGRAVIDA, THIRD TRIMESTER: ICD-10-CM

## 2023-08-07 DIAGNOSIS — O09.523 ADVANCED MATERNAL AGE IN MULTIGRAVIDA, THIRD TRIMESTER: Primary | ICD-10-CM

## 2023-08-07 LAB
NST ACOUSTIC STIMULATION: NORMAL
NST ACTION NECESSARY: NORMAL
NST ASSESSMENT: NORMAL
NST BASELINE: NORMAL
NST INDICATIONS: NORMAL
NST OTHER DATA: NORMAL
NST READ BY: NORMAL
NST RETURN: NORMAL
NST UTERINE ACTIVITY: NORMAL

## 2023-08-07 PROCEDURE — 3074F SYST BP LT 130 MM HG: CPT | Performed by: NURSE PRACTITIONER

## 2023-08-07 PROCEDURE — 59025 FETAL NON-STRESS TEST: CPT | Performed by: NURSE PRACTITIONER

## 2023-08-07 PROCEDURE — 87081 CULTURE SCREEN ONLY: CPT

## 2023-08-07 PROCEDURE — 87150 DNA/RNA AMPLIFIED PROBE: CPT

## 2023-08-07 PROCEDURE — 3078F DIAST BP <80 MM HG: CPT | Performed by: NURSE PRACTITIONER

## 2023-08-07 PROCEDURE — 0502F SUBSEQUENT PRENATAL CARE: CPT | Performed by: NURSE PRACTITIONER

## 2023-08-07 ASSESSMENT — FIBROSIS 4 INDEX: FIB4 SCORE: 0.77

## 2023-08-07 NOTE — PROGRESS NOTES
Pt here for OB follow up  + FM  Denies VB, LOF or Uc's  Good ph: [phone]  Pt states she is having some small contractions but they come and go

## 2023-08-08 LAB — GP B STREP DNA SPEC QL NAA+PROBE: NEGATIVE

## 2023-08-15 ENCOUNTER — ROUTINE PRENATAL (OUTPATIENT)
Dept: OBGYN | Facility: CLINIC | Age: 38
End: 2023-08-15
Payer: MEDICAID

## 2023-08-15 VITALS — BODY MASS INDEX: 28.56 KG/M2 | WEIGHT: 132 LBS | DIASTOLIC BLOOD PRESSURE: 52 MMHG | SYSTOLIC BLOOD PRESSURE: 98 MMHG

## 2023-08-15 DIAGNOSIS — O09.523 ADVANCED MATERNAL AGE IN MULTIGRAVIDA, THIRD TRIMESTER: Primary | ICD-10-CM

## 2023-08-15 PROCEDURE — 0502F SUBSEQUENT PRENATAL CARE: CPT | Performed by: NURSE PRACTITIONER

## 2023-08-15 PROCEDURE — 3074F SYST BP LT 130 MM HG: CPT | Performed by: NURSE PRACTITIONER

## 2023-08-15 PROCEDURE — 59025 FETAL NON-STRESS TEST: CPT | Performed by: NURSE PRACTITIONER

## 2023-08-15 PROCEDURE — 3078F DIAST BP <80 MM HG: CPT | Performed by: NURSE PRACTITIONER

## 2023-08-15 ASSESSMENT — FIBROSIS 4 INDEX: FIB4 SCORE: 0.77

## 2023-08-15 NOTE — PROGRESS NOTES
OB follow up   + fetal movement.  No VB, LOF or UC's.  Phone # [phone]  Preferred pharmacy confirmed.  GBS negative

## 2023-08-25 ENCOUNTER — ANESTHESIA (OUTPATIENT)
Dept: ANESTHESIOLOGY | Facility: MEDICAL CENTER | Age: 38
End: 2023-08-25
Payer: MEDICAID

## 2023-08-25 ENCOUNTER — HOSPITAL ENCOUNTER (INPATIENT)
Facility: MEDICAL CENTER | Age: 38
LOS: 2 days | End: 2023-08-27
Attending: STUDENT IN AN ORGANIZED HEALTH CARE EDUCATION/TRAINING PROGRAM | Admitting: STUDENT IN AN ORGANIZED HEALTH CARE EDUCATION/TRAINING PROGRAM
Payer: MEDICAID

## 2023-08-25 ENCOUNTER — APPOINTMENT (OUTPATIENT)
Dept: RADIOLOGY | Facility: MEDICAL CENTER | Age: 38
End: 2023-08-25
Payer: MEDICAID

## 2023-08-25 ENCOUNTER — ROUTINE PRENATAL (OUTPATIENT)
Dept: OBGYN | Facility: CLINIC | Age: 38
End: 2023-08-25
Payer: MEDICAID

## 2023-08-25 ENCOUNTER — ANESTHESIA EVENT (OUTPATIENT)
Dept: ANESTHESIOLOGY | Facility: MEDICAL CENTER | Age: 38
End: 2023-08-25
Payer: MEDICAID

## 2023-08-25 VITALS — WEIGHT: 135 LBS | BODY MASS INDEX: 29.21 KG/M2 | SYSTOLIC BLOOD PRESSURE: 113 MMHG | DIASTOLIC BLOOD PRESSURE: 72 MMHG

## 2023-08-25 DIAGNOSIS — O09.523 ADVANCED MATERNAL AGE IN MULTIGRAVIDA, THIRD TRIMESTER: Primary | ICD-10-CM

## 2023-08-25 LAB
BASOPHILS # BLD AUTO: 0.3 % (ref 0–1.8)
BASOPHILS # BLD: 0.05 K/UL (ref 0–0.12)
EOSINOPHIL # BLD AUTO: 0.03 K/UL (ref 0–0.51)
EOSINOPHIL NFR BLD: 0.2 % (ref 0–6.9)
ERYTHROCYTE [DISTWIDTH] IN BLOOD BY AUTOMATED COUNT: 46.9 FL (ref 35.9–50)
HCT VFR BLD AUTO: 39.4 % (ref 37–47)
HGB BLD-MCNC: 13.9 G/DL (ref 12–16)
HOLDING TUBE BB 8507: NORMAL
IMM GRANULOCYTES # BLD AUTO: 0.08 K/UL (ref 0–0.11)
IMM GRANULOCYTES NFR BLD AUTO: 0.5 % (ref 0–0.9)
LYMPHOCYTES # BLD AUTO: 2.19 K/UL (ref 1–4.8)
LYMPHOCYTES NFR BLD: 13.4 % (ref 22–41)
MCH RBC QN AUTO: 32 PG (ref 27–33)
MCHC RBC AUTO-ENTMCNC: 35.3 G/DL (ref 32.2–35.5)
MCV RBC AUTO: 90.8 FL (ref 81.4–97.8)
MONOCYTES # BLD AUTO: 0.8 K/UL (ref 0–0.85)
MONOCYTES NFR BLD AUTO: 4.9 % (ref 0–13.4)
NEUTROPHILS # BLD AUTO: 13.16 K/UL (ref 1.82–7.42)
NEUTROPHILS NFR BLD: 80.7 % (ref 44–72)
NRBC # BLD AUTO: 0 K/UL
NRBC BLD-RTO: 0 /100 WBC (ref 0–0.2)
NST ACOUSTIC STIMULATION: ABNORMAL
NST ACTION NECESSARY: ABNORMAL
NST ASSESSMENT: ABNORMAL
NST BASELINE: 155
NST INDICATIONS: ABNORMAL
NST OTHER DATA: ABNORMAL
NST READ BY: ABNORMAL
NST RETURN: ABNORMAL
NST UTERINE ACTIVITY: ABNORMAL
PLATELET # BLD AUTO: 223 K/UL (ref 164–446)
PMV BLD AUTO: 11 FL (ref 9–12.9)
RBC # BLD AUTO: 4.34 M/UL (ref 4.2–5.4)
T PALLIDUM AB SER QL IA: NORMAL
WBC # BLD AUTO: 16.3 K/UL (ref 4.8–10.8)

## 2023-08-25 PROCEDURE — 99999 PR NO CHARGE: CPT | Performed by: OBSTETRICS & GYNECOLOGY

## 2023-08-25 PROCEDURE — 700105 HCHG RX REV CODE 258: Performed by: STUDENT IN AN ORGANIZED HEALTH CARE EDUCATION/TRAINING PROGRAM

## 2023-08-25 PROCEDURE — A9270 NON-COVERED ITEM OR SERVICE: HCPCS | Performed by: STUDENT IN AN ORGANIZED HEALTH CARE EDUCATION/TRAINING PROGRAM

## 2023-08-25 PROCEDURE — 3074F SYST BP LT 130 MM HG: CPT | Performed by: NURSE PRACTITIONER

## 2023-08-25 PROCEDURE — 85025 COMPLETE CBC W/AUTO DIFF WBC: CPT

## 2023-08-25 PROCEDURE — 303615 HCHG EPIDURAL/SPINAL ANESTHESIA FOR LABOR

## 2023-08-25 PROCEDURE — 770002 HCHG ROOM/CARE - OB PRIVATE (112)

## 2023-08-25 PROCEDURE — 700101 HCHG RX REV CODE 250: Performed by: ANESTHESIOLOGY

## 2023-08-25 PROCEDURE — 99999 PR NO CHARGE: CPT | Performed by: NURSE PRACTITIONER

## 2023-08-25 PROCEDURE — 700111 HCHG RX REV CODE 636 W/ 250 OVERRIDE (IP): Performed by: ANESTHESIOLOGY

## 2023-08-25 PROCEDURE — 86780 TREPONEMA PALLIDUM: CPT

## 2023-08-25 PROCEDURE — 700102 HCHG RX REV CODE 250 W/ 637 OVERRIDE(OP): Performed by: STUDENT IN AN ORGANIZED HEALTH CARE EDUCATION/TRAINING PROGRAM

## 2023-08-25 PROCEDURE — 3078F DIAST BP <80 MM HG: CPT | Performed by: NURSE PRACTITIONER

## 2023-08-25 PROCEDURE — 59025 FETAL NON-STRESS TEST: CPT | Mod: 26 | Performed by: OBSTETRICS & GYNECOLOGY

## 2023-08-25 PROCEDURE — 36415 COLL VENOUS BLD VENIPUNCTURE: CPT

## 2023-08-25 PROCEDURE — 302449 STATCHG TRIAGE ONLY (STATISTIC)

## 2023-08-25 PROCEDURE — 700111 HCHG RX REV CODE 636 W/ 250 OVERRIDE (IP): Mod: JZ | Performed by: STUDENT IN AN ORGANIZED HEALTH CARE EDUCATION/TRAINING PROGRAM

## 2023-08-25 PROCEDURE — 700111 HCHG RX REV CODE 636 W/ 250 OVERRIDE (IP): Mod: JZ | Performed by: ANESTHESIOLOGY

## 2023-08-25 RX ORDER — BUPIVACAINE HYDROCHLORIDE 2.5 MG/ML
INJECTION, SOLUTION EPIDURAL; INFILTRATION; INTRACAUDAL PRN
Status: DISCONTINUED | OUTPATIENT
Start: 2023-08-25 | End: 2023-08-26 | Stop reason: SURG

## 2023-08-25 RX ORDER — SODIUM CHLORIDE, SODIUM LACTATE, POTASSIUM CHLORIDE, AND CALCIUM CHLORIDE .6; .31; .03; .02 G/100ML; G/100ML; G/100ML; G/100ML
250 INJECTION, SOLUTION INTRAVENOUS PRN
Status: DISCONTINUED | OUTPATIENT
Start: 2023-08-25 | End: 2023-08-26 | Stop reason: HOSPADM

## 2023-08-25 RX ORDER — ONDANSETRON 4 MG/1
4 TABLET, ORALLY DISINTEGRATING ORAL EVERY 6 HOURS PRN
Status: DISCONTINUED | OUTPATIENT
Start: 2023-08-25 | End: 2023-08-26 | Stop reason: HOSPADM

## 2023-08-25 RX ORDER — ROPIVACAINE HYDROCHLORIDE 2 MG/ML
INJECTION, SOLUTION EPIDURAL; INFILTRATION; PERINEURAL CONTINUOUS
Status: DISCONTINUED | OUTPATIENT
Start: 2023-08-25 | End: 2023-08-27 | Stop reason: HOSPADM

## 2023-08-25 RX ORDER — METHYLERGONOVINE MALEATE 0.2 MG/ML
0.2 INJECTION INTRAVENOUS
Status: COMPLETED | OUTPATIENT
Start: 2023-08-25 | End: 2023-08-26

## 2023-08-25 RX ORDER — ACETAMINOPHEN 500 MG
1000 TABLET ORAL
Status: COMPLETED | OUTPATIENT
Start: 2023-08-25 | End: 2023-08-25

## 2023-08-25 RX ORDER — SODIUM CHLORIDE, SODIUM LACTATE, POTASSIUM CHLORIDE, CALCIUM CHLORIDE 600; 310; 30; 20 MG/100ML; MG/100ML; MG/100ML; MG/100ML
INJECTION, SOLUTION INTRAVENOUS CONTINUOUS
Status: DISCONTINUED | OUTPATIENT
Start: 2023-08-25 | End: 2023-08-27 | Stop reason: HOSPADM

## 2023-08-25 RX ORDER — TERBUTALINE SULFATE 1 MG/ML
0.25 INJECTION, SOLUTION SUBCUTANEOUS
Status: DISCONTINUED | OUTPATIENT
Start: 2023-08-25 | End: 2023-08-26 | Stop reason: HOSPADM

## 2023-08-25 RX ORDER — LIDOCAINE HYDROCHLORIDE 10 MG/ML
20 INJECTION, SOLUTION INFILTRATION; PERINEURAL
Status: DISCONTINUED | OUTPATIENT
Start: 2023-08-25 | End: 2023-08-26 | Stop reason: HOSPADM

## 2023-08-25 RX ORDER — OXYTOCIN 10 [USP'U]/ML
10 INJECTION, SOLUTION INTRAMUSCULAR; INTRAVENOUS
Status: DISCONTINUED | OUTPATIENT
Start: 2023-08-25 | End: 2023-08-26 | Stop reason: HOSPADM

## 2023-08-25 RX ORDER — LIDOCAINE HYDROCHLORIDE AND EPINEPHRINE 15; 5 MG/ML; UG/ML
INJECTION, SOLUTION EPIDURAL
Status: COMPLETED | OUTPATIENT
Start: 2023-08-25 | End: 2023-08-25

## 2023-08-25 RX ORDER — ROPIVACAINE HYDROCHLORIDE 2 MG/ML
INJECTION, SOLUTION EPIDURAL; INFILTRATION; PERINEURAL
Status: ACTIVE
Start: 2023-08-25 | End: 2023-08-26

## 2023-08-25 RX ORDER — ONDANSETRON 2 MG/ML
4 INJECTION INTRAMUSCULAR; INTRAVENOUS EVERY 6 HOURS PRN
Status: DISCONTINUED | OUTPATIENT
Start: 2023-08-25 | End: 2023-08-26 | Stop reason: HOSPADM

## 2023-08-25 RX ORDER — SODIUM CHLORIDE, SODIUM LACTATE, POTASSIUM CHLORIDE, AND CALCIUM CHLORIDE .6; .31; .03; .02 G/100ML; G/100ML; G/100ML; G/100ML
500 INJECTION, SOLUTION INTRAVENOUS ONCE
Status: COMPLETED | OUTPATIENT
Start: 2023-08-25 | End: 2023-08-25

## 2023-08-25 RX ORDER — IBUPROFEN 800 MG/1
800 TABLET ORAL
Status: DISCONTINUED | OUTPATIENT
Start: 2023-08-25 | End: 2023-08-26 | Stop reason: HOSPADM

## 2023-08-25 RX ORDER — MISOPROSTOL 200 UG/1
800 TABLET ORAL
Status: COMPLETED | OUTPATIENT
Start: 2023-08-25 | End: 2023-08-26

## 2023-08-25 RX ORDER — BUPIVACAINE HYDROCHLORIDE 2.5 MG/ML
INJECTION, SOLUTION EPIDURAL; INFILTRATION; INTRACAUDAL
Status: COMPLETED
Start: 2023-08-25 | End: 2023-08-25

## 2023-08-25 RX ORDER — SODIUM CHLORIDE, SODIUM LACTATE, POTASSIUM CHLORIDE, AND CALCIUM CHLORIDE .6; .31; .03; .02 G/100ML; G/100ML; G/100ML; G/100ML
1000 INJECTION, SOLUTION INTRAVENOUS
Status: DISCONTINUED | OUTPATIENT
Start: 2023-08-25 | End: 2023-08-26 | Stop reason: HOSPADM

## 2023-08-25 RX ORDER — EPHEDRINE SULFATE 50 MG/ML
5 INJECTION, SOLUTION INTRAVENOUS
Status: DISCONTINUED | OUTPATIENT
Start: 2023-08-25 | End: 2023-08-26 | Stop reason: HOSPADM

## 2023-08-25 RX ADMIN — SODIUM CHLORIDE, POTASSIUM CHLORIDE, SODIUM LACTATE AND CALCIUM CHLORIDE: 600; 310; 30; 20 INJECTION, SOLUTION INTRAVENOUS at 20:40

## 2023-08-25 RX ADMIN — ROPIVACAINE HYDROCHLORIDE: 2 INJECTION, SOLUTION EPIDURAL; INFILTRATION at 19:43

## 2023-08-25 RX ADMIN — OXYTOCIN 2 MILLI-UNITS/MIN: 10 INJECTION, SOLUTION INTRAMUSCULAR; INTRAVENOUS at 16:37

## 2023-08-25 RX ADMIN — SODIUM CHLORIDE, POTASSIUM CHLORIDE, SODIUM LACTATE AND CALCIUM CHLORIDE 500 ML: 600; 310; 30; 20 INJECTION, SOLUTION INTRAVENOUS at 16:05

## 2023-08-25 RX ADMIN — SODIUM CHLORIDE, POTASSIUM CHLORIDE, SODIUM LACTATE AND CALCIUM CHLORIDE: 600; 310; 30; 20 INJECTION, SOLUTION INTRAVENOUS at 16:36

## 2023-08-25 RX ADMIN — FENTANYL CITRATE 100 MCG: 50 INJECTION, SOLUTION INTRAMUSCULAR; INTRAVENOUS at 19:33

## 2023-08-25 RX ADMIN — BUPIVACAINE HYDROCHLORIDE 5 ML: 2.5 INJECTION, SOLUTION EPIDURAL; INFILTRATION; INTRACAUDAL at 19:33

## 2023-08-25 RX ADMIN — ACETAMINOPHEN 1000 MG: 500 TABLET, FILM COATED ORAL at 23:24

## 2023-08-25 RX ADMIN — LIDOCAINE HYDROCHLORIDE,EPINEPHRINE BITARTRATE 3 ML: 15; .005 INJECTION, SOLUTION EPIDURAL; INFILTRATION; INTRACAUDAL; PERINEURAL at 19:27

## 2023-08-25 ASSESSMENT — FIBROSIS 4 INDEX
FIB4 SCORE: 0.77
FIB4 SCORE: 0.77

## 2023-08-25 ASSESSMENT — PATIENT HEALTH QUESTIONNAIRE - PHQ9
1. LITTLE INTEREST OR PLEASURE IN DOING THINGS: NOT AT ALL
SUM OF ALL RESPONSES TO PHQ9 QUESTIONS 1 AND 2: 0
2. FEELING DOWN, DEPRESSED, IRRITABLE, OR HOPELESS: NOT AT ALL

## 2023-08-25 ASSESSMENT — LIFESTYLE VARIABLES: EVER_SMOKED: NEVER

## 2023-08-25 NOTE — PROGRESS NOTES
OB follow up   + fetal movement.  No VB, LOF. Pts states she was feelinf Uc's today in the morning  Phone # [phone] (home)   Preferred pharmacy confirmed.

## 2023-08-25 NOTE — PROGRESS NOTES
S: Pt is a 38 y.o.  at 39w1d with  Estimated Date of Delivery: 23 who presents for scheduled NST for AMA. No complaints.  Denies VB or LOF.  Reports good FM.  Is feeling irregular painful UC's    O: /72   Wt 135 lb   LMP 2022 (Approximate)   BMI 29.21 kg/m²          FHTs: baseline 155, accels negative,  decels positive,  Variability minimal       [place]: Irregular UCs           A/P  Patient Active Problem List    Diagnosis Date Noted    Advanced maternal age in multigravida, third trimester 2023    ASCUS with positive high risk human papillomavirus of vagina 2023    Grand multiparity 2023       1.  IUP @ 39w1d  2.  Non-reactive NST.  3.  To L&D now for further assessment    Gita COTOM, APRN

## 2023-08-25 NOTE — PROGRESS NOTES
"1300: Report from April T RN. Pt ambulated to labor room 217.     1442: Pt states \" I think my water broke.\" Pt appears to be grossly ruptured. MD notified     1637: Pitocin started per MD     175: Dr. Bowden reviewed FHT tracing and notified of pt oral temp readings, see flow sheets     190: Dr. Chacon at bedside to place epidural     : timeout - epidural     : test dose     2010: Josue FABIAN APRN at bedside- rupture fore bag, SVE     225:  Josue FABIAN ARPN at bedside- SVE complete     2300: begin pushing- Josue ALBARRAN at bedside for pushing effort     2330: Dr. Bowden at bedside to assist with pushing effort     2358: Viable female infant delivered via . APGARs 1/5/7.     0015: Report to Claudette D RN.       "

## 2023-08-25 NOTE — ED PROVIDER NOTES
LABOR AND DELIVERY TRIAGE NOTE    PATIENT ID:  NAME:  Isabel Galarza  MRN:               [id number]  YOB: 1985     38 y.o. female  at 39w1d.    Subjective: Pt presents from clinic for non-reactive NST. Pt has been getting weekly NSTs due to AMA, have been normal thus far.  Pt states fetal mvmt has been normal today. FOB only know about 2 other previous pregnancies. The other 4 children were adopted out.This is their first baby together. Patient is bloodless. Desires epidural. Pregnancy complicated by advanced maternal age.     positive for contractions  positive pain   positive for LOF  negative for vaginal bleeding  positive for fetal movement    PNL:  Blood Type O POS, Rubella immune, HIV neg, TrepAb neg, HBsAg NR, GC/CT neg/neg  1 HR GTT: 131  GBS negative      ROS: Patient denies any fever chills, nausea, vomiting, headache, chest pain, shortness of breath, or dysuria or unusual swelling of hands or feet.     PMHx:   No past medical history on file.     OB History    Para Term  AB Living   10 6 6   3 6   SAB IAB Ectopic Molar Multiple Live Births   2   1     6      # Outcome Date GA Lbr Julian/2nd Weight Sex Delivery Anes PTL Lv   10 Current            9 Term 17 39w4d  3.17 kg (6 lb 15.8 oz) F Vag-Spont   DARIUS   8 2014     SAB         Birth Comments: Baby passed on its own.   7 2013     SAB         Birth Comments: Baby passed on  it own   6 Term 11/23/10 40w0d   M Vag-Spont EPI N DARIUS   5 Term 08/10/09 39w1d 08:00 3.26 kg (7 lb 3 oz) F Vag-Spont None N DARIUS   4 Term 10/23/07 40w0d 08:00  M Vag-Spont None N DARIUS   3 Term 06 38w2d 10:00  F Vag-Spont None N DARIUS   2 Ectopic 2005 5w0d          1 Term 04/10/02 40w0d 15:00  F Vag-Spont None N DARIUS       GYN: denies STIs, Colpo done 23 for ASCUS, will need r/p postpartum     PSHx:  Past Surgical History:   Procedure Laterality Date    TUBE & ECTOPIC PREG., REMOVAL      GYN SURGERY         Social Hx:  Social  "History     Tobacco Use    Smoking status: Never    Smokeless tobacco: Never   Vaping Use    Vaping Use: Never used   Substance Use Topics    Alcohol use: Not Currently     Comment: socially    Drug use: Not Currently     Types: Marijuana     Comment: occ         Medications:  No current facility-administered medications on file prior to encounter.     Current Outpatient Medications on File Prior to Encounter   Medication Sig Dispense Refill    Prenatal MV-Min-Fe Fum-FA-DHA (PRENATAL 1 PO) Take  by mouth.         Allergies:  Bloodless, Kiwi extract, Pineapple, and Sulfa drugs      Objective:    Vitals:    23 1155   BP: 111/66   Pulse: 86   Resp: 18   Temp: 37 °C (98.6 °F)   TempSrc: Temporal   SpO2: 98%   Weight: 61.2 kg (135 lb)   Height: 1.448 m (4' 9\")     Temp (24hrs), Av °C (98.6 °F), Min:37 °C (98.6 °F), Max:37 °C (98.6 °F)    General: No acute distress, resting comfortably in bed.  HEENT: normocephalic, nontraumatic, PERRLA, EOMI  Cardiovascular: Heart RRR with no murmurs, rubs or gallops. Distal Pulses 2+  Respiratory: symmetric chest expansion, lungs CTAB, with no wheezes, rales, rhonci  Abdomen: gravid, nontender  Musculoskeletal: ROBERTS spontaneously  Neuro: non focal with no numbness, tingling or changes in sensation    Cervix:  1/90/ballotable/vertex  [place]: Uterine Contractions Q2-3 minutes.   FHRM: Baseline 150, minimal variability, few accels, few variable decels      Assessment: 38 y.o. female  at 39w1d presents with non-reactive NST. Admit for IOL due to gestational age/non-reassuring FHR.    #SIUP @ 39w1d by 8w US  -Adequate Prenatal care with [facility]     #IOL  - Admit for IOL for gestational age/non-reassuring FHR    #Fetal status   - FHT: cat 2    #GBS status  -GBS: negative     #Rubella immune, HIV neg, TrepAb neg, HBsAg NR, GC/CT neg/neg    #healthcare maintenance   -TdaP declined      - Patient is cleared to return home with family. Encouraged to see MD/DO for increased painful " uterine contractions @ 3-5, vaginal bleeding, loss of fluid, or other serious symptoms.      Discussed case with Dr. Marcial, [facility] Attending. Case was discussed and attending agreed with plan prior to admitting patient.       Fabiano Lambert MD  PGY-1, [facility]

## 2023-08-25 NOTE — CARE PLAN
The patient is Stable - Low risk of patient condition declining or worsening    Shift Goals  Clinical Goals: pain management  Patient Goals: healthy baby, healthy mom    Progress made toward(s) clinical / shift goals:  Pt request for epidural placement when in active labor    Patient is not progressing towards the following goals:

## 2023-08-25 NOTE — PROGRESS NOTES
38 y.o.  here to LDA3 sent over from clinic after non-reactive NST. EFM & Apex placed. VSS. FOB Des at bedside. Pt states  FOB only know about 2 other previous pregnancies. The other 4 children were adopted out.This is their first baby together. FOB is Beninese speaking only.   1230 SVE per Dr. Lambert's request. /ballotable/vertex.   Admit order received. Plan for IOL. Report to Elo Espitia RN, Pt transferred to room 217 for Labor and delivery.

## 2023-08-25 NOTE — H&P
OB H&P:      HPI:  Ms. Isabel Galarza is a 38 y.o.  @ 39w1d by 8wUS presents from clinic for non-reactive NST. Pt has been getting weekly NSTs due to AMA, have been normal thus far.  Pt states fetal mvmt has been normal today. Patient is bloodless, consents signed. Desires epidural. Pregnancy complicated by advanced maternal age and Grand multiparity.     FOB only know about 2 other previous pregnancies. The other 4 children were adopted out.This is their first baby together.    Contractions: Yes   Loss of fluid: Yes   Vaginal bleeding: No   Fetal movement: present      [facility]    PNL:  Blood Type O POS, Rubella immune, HIV neg, TrepAb neg, HBsAg NR, GC/CT neg/neg  1 HR GTT: 131  GBS negative      ROS:  Const: denies fevers, general concerns  CV/resp: reports no concerns  GI: denies abd pain, GI concerns  : see HPI  Neuro: denies HA/vision changes    OB History    Para Term  AB Living   10 6 6   3 6   SAB IAB Ectopic Molar Multiple Live Births   2   1     6      # Outcome Date GA Lbr Julian/2nd Weight Sex Delivery Anes PTL Lv   10 Current            9 Term 17 39w4d  3.17 kg (6 lb 15.8 oz) F Vag-Spont   DARIUS   8 2014     SAB         Birth Comments: Baby passed on its own.   7 2013     SAB         Birth Comments: Baby passed on  it own   6 Term 11/23/10 40w0d   M Vag-Spont EPI N DARIUS   5 Term 08/10/09 39w1d 08:00 3.26 kg (7 lb 3 oz) F Vag-Spont None N DARIUS   4 Term 10/23/07 40w0d 08:00  M Vag-Spont None N DARIUS   3 Term 06 38w2d 10:00  F Vag-Spont None N DARIUS   2 Ectopic 2005 5w0d          1 Term 04/10/02 40w0d 15:00  F Vag-Spont None N DARIUS       GYN: denies STIs, Colpo done 23 for ASCUS, will need r/p postpartum    History reviewed. No pertinent past medical history.    Past Surgical History:   Procedure Laterality Date    TUBE & ECTOPIC PREG., REMOVAL      GYN SURGERY         No current facility-administered medications on file prior to  "encounter.     Current Outpatient Medications on File Prior to Encounter   Medication Sig Dispense Refill    Prenatal MV-Min-Fe Fum-FA-DHA (PRENATAL 1 PO) Take  by mouth.         Family History   Problem Relation Age of Onset    No Known Problems Mother     No Known Problems Father        Social History     Tobacco Use    Smoking status: Never    Smokeless tobacco: Never   Vaping Use    Vaping Use: Former   Substance Use Topics    Alcohol use: Not Currently     Comment: socially    Drug use: Not Currently     Types: Marijuana     Comment: occ         PE:  Vitals:    23 1155   BP: 111/66   Pulse: 86   Resp: 18   Temp: 37 °C (98.6 °F)   TempSrc: Temporal   SpO2: 98%   Weight: 61.2 kg (135 lb)   Height: 1.448 m (4' 9\")       GEN: AAO, NAD  HEENT: normocephalic, atraumatic, EOMI  CV: rrr, no m/r/g  Resp: CTAB, no w/r/r  Abd: soft, gravid, NT, EFW 2500-3000g  Ext: NT, no peripheral edema  Derm: no visible lesions or rashes  Neuro: grossly intact    Cervix:  /ballotable/vertex  [place]:   Uterine Contractions Q2-3 minutes.   FHRM: Baseline 150, minimal variability, few accels, few variable decels    A/P: 38 y.o.  @ 39w1d by 8wUS presents with non-reactive NST. Admit for IOL due to gestational age/non-reassuring FHR.    #SIUP @ 39w1d by 8w US  -adequate PNL with [facility]     #IOL for gestational age, non-reactive NST  - labor: latent   - s/p SROM @ 1445  - start IVLR 125cc/hr  - pain: plan for epidural     #Fetal status   - FHT: cat 2    #Bloodless  - Consent forms signed, pt aware of risks    #GBS status  -GBS: negative     #Rubella immune, HIV neg, TrepAb neg, HBsAg NR, GC/CT neg/neg    #healthcare maintenance   -TdaP declined      Fabiano Lambert  PGY-1, [facility]        "

## 2023-08-26 LAB
ERYTHROCYTE [DISTWIDTH] IN BLOOD BY AUTOMATED COUNT: 50.2 FL (ref 35.9–50)
HCT VFR BLD AUTO: 36 % (ref 37–47)
HGB BLD-MCNC: 12.1 G/DL (ref 12–16)
MCH RBC QN AUTO: 31.8 PG (ref 27–33)
MCHC RBC AUTO-ENTMCNC: 33.6 G/DL (ref 32.2–35.5)
MCV RBC AUTO: 94.7 FL (ref 81.4–97.8)
PLATELET # BLD AUTO: 184 K/UL (ref 164–446)
PMV BLD AUTO: 10.9 FL (ref 9–12.9)
RBC # BLD AUTO: 3.8 M/UL (ref 4.2–5.4)
WBC # BLD AUTO: 22.9 K/UL (ref 4.8–10.8)

## 2023-08-26 PROCEDURE — 85027 COMPLETE CBC AUTOMATED: CPT

## 2023-08-26 PROCEDURE — 700111 HCHG RX REV CODE 636 W/ 250 OVERRIDE (IP): Performed by: STUDENT IN AN ORGANIZED HEALTH CARE EDUCATION/TRAINING PROGRAM

## 2023-08-26 PROCEDURE — 700102 HCHG RX REV CODE 250 W/ 637 OVERRIDE(OP): Performed by: ADVANCED PRACTICE MIDWIFE

## 2023-08-26 PROCEDURE — A9270 NON-COVERED ITEM OR SERVICE: HCPCS | Performed by: STUDENT IN AN ORGANIZED HEALTH CARE EDUCATION/TRAINING PROGRAM

## 2023-08-26 PROCEDURE — 59400 OBSTETRICAL CARE: CPT | Performed by: ADVANCED PRACTICE MIDWIFE

## 2023-08-26 PROCEDURE — 36415 COLL VENOUS BLD VENIPUNCTURE: CPT

## 2023-08-26 PROCEDURE — 3E033VJ INTRODUCTION OF OTHER HORMONE INTO PERIPHERAL VEIN, PERCUTANEOUS APPROACH: ICD-10-PCS | Performed by: STUDENT IN AN ORGANIZED HEALTH CARE EDUCATION/TRAINING PROGRAM

## 2023-08-26 PROCEDURE — 59409 OBSTETRICAL CARE: CPT

## 2023-08-26 PROCEDURE — 700102 HCHG RX REV CODE 250 W/ 637 OVERRIDE(OP): Performed by: STUDENT IN AN ORGANIZED HEALTH CARE EDUCATION/TRAINING PROGRAM

## 2023-08-26 PROCEDURE — 304965 HCHG RECOVERY SERVICES

## 2023-08-26 PROCEDURE — 770002 HCHG ROOM/CARE - OB PRIVATE (112)

## 2023-08-26 PROCEDURE — 700105 HCHG RX REV CODE 258: Performed by: STUDENT IN AN ORGANIZED HEALTH CARE EDUCATION/TRAINING PROGRAM

## 2023-08-26 PROCEDURE — 700105 HCHG RX REV CODE 258: Performed by: ADVANCED PRACTICE MIDWIFE

## 2023-08-26 PROCEDURE — A9270 NON-COVERED ITEM OR SERVICE: HCPCS | Performed by: ADVANCED PRACTICE MIDWIFE

## 2023-08-26 PROCEDURE — 700111 HCHG RX REV CODE 636 W/ 250 OVERRIDE (IP): Performed by: ADVANCED PRACTICE MIDWIFE

## 2023-08-26 RX ORDER — DOCUSATE SODIUM 100 MG/1
100 CAPSULE, LIQUID FILLED ORAL 2 TIMES DAILY PRN
Status: DISCONTINUED | OUTPATIENT
Start: 2023-08-26 | End: 2023-08-27 | Stop reason: HOSPADM

## 2023-08-26 RX ORDER — IBUPROFEN 800 MG/1
800 TABLET ORAL EVERY 8 HOURS PRN
Status: DISCONTINUED | OUTPATIENT
Start: 2023-08-26 | End: 2023-08-27 | Stop reason: HOSPADM

## 2023-08-26 RX ORDER — VITAMIN A ACETATE, BETA CAROTENE, ASCORBIC ACID, CHOLECALCIFEROL, .ALPHA.-TOCOPHEROL ACETATE, DL-, THIAMINE MONONITRATE, RIBOFLAVIN, NIACINAMIDE, PYRIDOXINE HYDROCHLORIDE, FOLIC ACID, CYANOCOBALAMIN, CALCIUM CARBONATE, FERROUS FUMARATE, ZINC OXIDE, CUPRIC OXIDE 3080; 12; 120; 400; 1; 1.84; 3; 20; 22; 920; 25; 200; 27; 10; 2 [IU]/1; UG/1; MG/1; [IU]/1; MG/1; MG/1; MG/1; MG/1; MG/1; [IU]/1; MG/1; MG/1; MG/1; MG/1; MG/1
1 TABLET, FILM COATED ORAL
Status: DISCONTINUED | OUTPATIENT
Start: 2023-08-26 | End: 2023-08-27 | Stop reason: HOSPADM

## 2023-08-26 RX ORDER — ACETAMINOPHEN 500 MG
1000 TABLET ORAL EVERY 6 HOURS PRN
Status: DISCONTINUED | OUTPATIENT
Start: 2023-08-26 | End: 2023-08-27 | Stop reason: HOSPADM

## 2023-08-26 RX ORDER — SODIUM CHLORIDE, SODIUM LACTATE, POTASSIUM CHLORIDE, CALCIUM CHLORIDE 600; 310; 30; 20 MG/100ML; MG/100ML; MG/100ML; MG/100ML
INJECTION, SOLUTION INTRAVENOUS PRN
Status: DISCONTINUED | OUTPATIENT
Start: 2023-08-26 | End: 2023-08-27 | Stop reason: HOSPADM

## 2023-08-26 RX ADMIN — IBUPROFEN 800 MG: 800 TABLET, FILM COATED ORAL at 03:40

## 2023-08-26 RX ADMIN — AMPICILLIN SODIUM 2000 MG: 2 INJECTION, POWDER, FOR SOLUTION INTRAMUSCULAR; INTRAVENOUS at 13:05

## 2023-08-26 RX ADMIN — PRENATAL WITH FERROUS FUM AND FOLIC ACID 1 TABLET: 3080; 920; 120; 400; 22; 1.84; 3; 20; 10; 1; 12; 200; 27; 25; 2 TABLET ORAL at 10:30

## 2023-08-26 RX ADMIN — AMPICILLIN SODIUM 2000 MG: 2 INJECTION, POWDER, FOR SOLUTION INTRAMUSCULAR; INTRAVENOUS at 18:07

## 2023-08-26 RX ADMIN — ACETAMINOPHEN 1000 MG: 500 TABLET, FILM COATED ORAL at 05:27

## 2023-08-26 RX ADMIN — MISOPROSTOL 800 MCG: 200 TABLET ORAL at 00:02

## 2023-08-26 RX ADMIN — AMPICILLIN SODIUM 2000 MG: 2 INJECTION, POWDER, FOR SOLUTION INTRAMUSCULAR; INTRAVENOUS at 01:02

## 2023-08-26 RX ADMIN — OXYTOCIN 20 UNITS: 10 INJECTION, SOLUTION INTRAMUSCULAR; INTRAVENOUS at 00:24

## 2023-08-26 RX ADMIN — ACETAMINOPHEN 1000 MG: 500 TABLET, FILM COATED ORAL at 13:05

## 2023-08-26 RX ADMIN — GENTAMICIN SULFATE 260 MG: 40 INJECTION, SOLUTION INTRAMUSCULAR; INTRAVENOUS at 01:51

## 2023-08-26 RX ADMIN — AMPICILLIN SODIUM 2000 MG: 2 INJECTION, POWDER, FOR SOLUTION INTRAMUSCULAR; INTRAVENOUS at 06:40

## 2023-08-26 RX ADMIN — DOCUSATE SODIUM 100 MG: 100 CAPSULE, LIQUID FILLED ORAL at 10:30

## 2023-08-26 RX ADMIN — OXYTOCIN 125 ML/HR: 10 INJECTION, SOLUTION INTRAMUSCULAR; INTRAVENOUS at 01:17

## 2023-08-26 ASSESSMENT — PAIN DESCRIPTION - PAIN TYPE
TYPE: ACUTE PAIN

## 2023-08-26 NOTE — DISCHARGE PLANNING
Birth certificate has been completed.    MOB has signed her portion of the Birth Certificate. FOB not present at time of signing. MOB doesn't know if he will return in time. MOB notified that father has 7 days to sign with [facility]. MOB agreed to notify FOB. MOB notified that if father does not show up within 7 days he will be pulled off the Birth certificate. MOB cleared to be discharge with Birth Certificates.

## 2023-08-26 NOTE — PROGRESS NOTES
"S: Pt is laying in bed s/p epidural placement. She reports she feels better. Partner is at bedside.      O:    Vitals:    23 1155 23 1755 23 1756 23 1900   BP: 111/66      Pulse: 86      Resp: 18      Temp: 37 °C (98.6 °F) 37.8 °C (100 °F) 37.6 °C (99.7 °F) 36.9 °C (98.4 °F)   TempSrc: Temporal Oral Oral Oral   SpO2: 98%      Weight: 61.2 kg (135 lb)      Height: 1.448 m (4' 9\")              FHTs:  Baseline 150, no accels, intermittent variable decels, moderate variability (periods of minimal variability)        [place]: Contractions q 1-3 minutes, moderate to palpation        Initially 1/100/-2 forebag   after 6/100/-2 with forebag ruptured returning blood tinged fluid     A/P:    1.  IUP @ 39w1d   2.  Cat II FHTs  - continue with position changes.   3.  Active Labor - discussed expectations with patient. Continue pitocin per protocol. Anticipate .    DEION Gomez, JUAN RAMON    "

## 2023-08-26 NOTE — CARE PLAN
The patient is Stable - Low risk of patient condition declining or worsening    Shift Goals  Clinical Goals: pain control, rest    Progress made toward(s) clinical / shift goals:  Patient pain well controlled with medication as needed, rest, and ambulation. VSS and WDL.     Patient is not progressing towards the following goals:

## 2023-08-26 NOTE — PROGRESS NOTES
Obstetrics & Gynecology Post-Delivery Progress Note    Date of Service: 23      38 y.o.  s/p vaginal, spontaneous  Delivery date: 2023      Subjective  Pt reports she is doing well since delivery last night. She reports abdominal pain that has been well controlled with Tylenol. She has been able to eat a little bit last night and she is drinking water. She has voided and passed one bowel movement without difficulty. She is resting with baby and has no concerns.    Pain: Well controlled with Tylenol  Bleeding: lochia moderate, 2-3 pads since last night  Tolerating PO: yes  Voiding: without difficulty  Ambulating: yes  Passing flatus: Yes  Feeding: breastfeeding well      Objective  24hr VS:  Temp:  [36.6 °C (97.9 °F)-38.6 °C (101.5 °F)] 36.6 °C (97.9 °F)  Pulse:  [] 70  Resp:  [17-18] 17  BP: ()/(50-74) 94/50  SpO2:  [96 %-98 %] 97 %    Physical Exam  Gen: well appearing, no apparent distress, resting comfortably in bed  Abd: soft, nontender, nondistended  Fundus: firm, TTP  Incision: not applicable, (vaginal delivery)  Ext: symmetric, no peripheral edema, calves nontender    Labs:  N/A    Medications  ampicillin, 2,000 mg, Intravenous, Q6HR  MD Alert…Gentamicin per Pharmacy, , Other, PHARMACY TO DOSE  prenatal plus vitamin, 1 Tablet, Oral, Daily-0800      PRN medications: LR, docusate sodium, ibuprofen, acetaminophen, tetanus-dipth-acell pertussis      Assessment/Plan  Isabel Galarza is a 38 y.o.yo  postpartum day #1  s/p vaginal, spontaneous    - Post care: meeting all goals  - Pain: controlled  - Rh+, Rubella Immune  - Method of Feeding: plans to breastfeed  - Method of Contraception:  Oral contraceptives, unsure of which type  VTE prophylaxis: none indicated    - Disposition: likely home PPD 2. Pt is receiving IV antibiotics due to fever during delivery. Continue to monitor for signs of recovery over today and into tomorrow.       Erica Leonardo, MS3

## 2023-08-26 NOTE — ANESTHESIA TIME REPORT
Anesthesia Start and Stop Event Times     Date Time Event    8/25/2023 1839 Ready for Procedure     1905 Anesthesia Start     2358 Anesthesia Stop        Responsible Staff  08/25/23    Name Role Begin End    Danielle Waters M.D. Anesth 1905 4128        Overtime Reason:  no overtime (within assigned shift)    Comments:

## 2023-08-26 NOTE — LACTATION NOTE
Isabel is a 38 year old , two previous children remain in household. She delivered this baby girl on  at 2345 vaginally.  Her new daughter weighed 6 lbs. 12.8 ounces.     Lactation was requested to assist mom as she was asking for supplementation.     At time of visit mom was attempting to latch baby. Baby was double wrapped in blankets with a swaddle on top. Discussed skin to skin and how it will assist with positioning and stimulating baby to nurse. Demonstrated hand expression with mom's permission explaining that taste and smell of colostrum will entice baby to nurse.    After unwrapping baby immediately started showing hunger cues. Placed in cross cradle position and baby latched immediately with a consistent nutritive suckle.  Isabel latched baby onto other breast with minimal assistance.     Discussed normal  feeding patterns. Strongly encouraged to avoid pacifiers or supplements to aid in milk production and correct latch. Mom is very concerned she does not have enough milk. She has not breastfeed in the past. Discussed infant tummy size and intake needs.    Lactation to follow in am.

## 2023-08-26 NOTE — ANESTHESIA PROCEDURE NOTES
Epidural Block    Date/Time: 8/25/2023 7:27 PM    Performed by: Danielle Waters M.D.  Authorized by: Danielle Waters M.D.    Patient Location:  OB  Start Time:  8/25/2023 7:27 PM  Reason for Block: labor analgesia    patient identified, IV checked, site marked, risks and benefits discussed, surgical consent, monitors and equipment checked and pre-op evaluation    Patient Position:  Sitting  Prep: ChloraPrep, patient draped and sterile technique    Monitoring:  Blood pressure, continuous pulse oximetry and heart rate  Approach:  Midline  Location:  L3-L4  Injection Technique:  VENKAT air and VENKAT saline  Skin infiltration:  Lidocaine  Strength:  1%  Dose:  3ml  Needle Type:  Tuohy  Needle Gauge:  17 G  Needle Length:  3.5 in  Loss of resistance::  4.5  Catheter Size:  19 G  Catheter at Skin Depth:  9  Test Dose Result:  Negative

## 2023-08-26 NOTE — PROGRESS NOTES
0700- Received report from KEYANA Blackman. Assumed care of pt. Declines needs at this time. 12 hour chart check, MAR and orders reviewed.     1015- Assessment complete. Fundus firm and palpable, lochia scant to light rubra. Pain management and interventions discussed with pt. Pt. Will call for pain medication as needed. MOB is breastfeeding and states breastfeeding is going well. POC discussed with patient at bedside. All questions and concerns discussed at this time. No further concerns.

## 2023-08-26 NOTE — PROGRESS NOTES
Obstetrics & Gynecology Post-Delivery Progress Note    Date of Service      38 y.o.  s/p vaginal, spontaneous  Delivery date: 2023    Events  No events    Subjective  Pain: No  Bleeding: lochia minimal  PO's: taking regular diet  Voiding: without difficulty  Ambulating: yes  Passing flatus: No  Feeding: breastfeeding with some difficulty related to cracked nipples    Objective  Temp:  [36.9 °C (98.4 °F)-38.6 °C (101.5 °F)] 37.8 °C (100 °F)  Pulse:  [] 82  Resp:  [17-18] 17  BP: ()/(51-74) 111/51  SpO2:  [96 %-98 %] 96 %    Physical Exam  General: well and resting  Chest/Breasts: nipples intact   Abdomen: nontender, non-distended  Fundus: firm and at umbilicus  Incision: not applicable, (vaginal delivery)  Perineum: deferred  Extremities: symmetric, calves nontender    Recent Labs     23  1315   WBC 16.3*   RBC 4.34   HEMOGLOBIN 13.9   HEMATOCRIT 39.4   MCV 90.8   MCH 32.0   RDW 46.9   PLATELETCT 223   MPV 11.0   NEUTSPOLYS 80.70*   LYMPHOCYTES 13.40*   MONOCYTES 4.90   EOSINOPHILS 0.20   BASOPHILS 0.30       Assessment/Plan  Isabel Galarza is a 38 y.o.yo  s/p postpartum day #1  s/p vaginal, spontaneous    1. Post care: meeting all goals  2. Hemodynamics: awaiting CBC  3. Pain: controlled  4. Rh+, Rubella Immune  5. Method of Feeding: plans to breastfeed  6. Method of Contraception:  Depo Provera  7. Disposition: likely home postpartum day 2    VTE prophylaxis: none indicated

## 2023-08-26 NOTE — ANESTHESIA POSTPROCEDURE EVALUATION
Patient: Isabel Galarza    Procedure Summary     Date: 08/25/23 Room / Location:     Anesthesia Start: 1905 Anesthesia Stop: 2358    Procedure: Labor Epidural Diagnosis:     Scheduled Providers:  Responsible Provider: Danielle Waters M.D.    Anesthesia Type: epidural ASA Status: 2          Final Anesthesia Type: epidural  Last vitals  BP   Blood Pressure: 104/52    Temp   37.7 °C (99.8 °F)    Pulse   78   Resp   17    SpO2   96 %      Anesthesia Post Evaluation    Patient location during evaluation: PACU  Patient participation: complete - patient participated  Level of consciousness: awake and alert    Airway patency: patent  Anesthetic complications: no  Cardiovascular status: hemodynamically stable  Respiratory status: acceptable  Hydration status: euvolemic    PONV: none          No notable events documented.     Nurse Pain Score: 0 (NPRS)

## 2023-08-26 NOTE — ANESTHESIA PREPROCEDURE EVALUATION
Date: 08/25/23  Procedure: Labor Epidural         Relevant Problems   No relevant active problems       Physical Exam    Airway   Mallampati: II  TM distance: >3 FB  Neck ROM: full       Cardiovascular - normal exam  Rhythm: regular  Rate: normal  (-) murmur     Dental - normal exam           Pulmonary - normal exam  Breath sounds clear to auscultation     Abdominal    Neurological - normal exam                 Anesthesia Plan    ASA 2       Plan - epidural   Neuraxial block will be labor analgesia                  Pertinent diagnostic labs and testing reviewed    Informed Consent:    Anesthetic plan and risks discussed with patient.      Special considerations: [place].

## 2023-08-26 NOTE — CARE PLAN
The patient is Stable - Low risk of patient condition declining or worsening    Shift Goals  Clinical Goals: rest, pain control, normal lochia, VSS  Patient Goals: healthy baby, healthy mom    Progress made toward(s) clinical / shift goals:    Problem: Pain - Standard  Goal: Alleviation of pain or a reduction in pain to the patient’s comfort goal  8/26/2023 0609 by Hiral Ivan R.N.  Outcome: Progressing  Note: Pain controlled at this time, will be medicated as needed.   8/26/2023 0548 by Hiral Ivan, R.N.  Outcome: Progressing  Note: Pain level within comfort goal. Will be medicated as needed.      Problem: Altered Physiologic Condition  Goal: Patient physiologically stable as evidenced by normal lochia, palpable uterine involution and vitals within normal limits  8/26/2023 0609 by Hiral Ivan, R.N.  Outcome: Progressing  Note: Fundus firm at U, lochia rubra minimal. VSS, Afebrile.  8/26/2023 0549 by Hiral Ivan, R.N.  Outcome: Progressing  Note: Fundus firm at U, lochia rubra minimal. VSS,        Patient is not progressing towards the following goals:

## 2023-08-26 NOTE — L&D DELIVERY NOTE
Admit Date:   2023      Pregnancy Complications: AMA  Medical Induction of Labor: yes for non reactive NST in office  GBS: negative  Anesthesia: epidural  Gestational Age at delivery: 39.2 weeks    Patient progressed and was noted to be AL/0 station. Anterior lup reduced with maternal effort after three pushes. She then pushed effectively with limited descent. Maternal vaginal vault noted to be warm and temperature revealed to be elevated. Antibiotics and tylenol ordered. However, intermittent variable and late decelerations were encountered. Dr Bowden called for assessment while manual rotation was attempted due to ROP positioning. Dr. Bowden in room assessing pushing efforts. Patient moved to hands and knees and pushed well x 6 contractions. She was then moved back to lithotomy and noted to be complete +1. Additional pushing efforts were made. After application of lubricant around fetal head, infant decended from +1 to +4 station with pushing. Room set up for delivery again and supportive staff in room. Educated patient on potential for shoulder dystocia prior to her next pushing effort due to slow descent.   Dr. Bowden in room. Patient pushed well and delivery of fetal head was noted showing thick meconium stained fluid with nuchal x 1. Infant delivered in SHAAN position at 2358. Nuchal x1 reduced via Somersault maneuver and infant placed to maternal abdomen where she was dried and stimulated without any response. Cord clamped and cut quickly with baby given to awaiting nursery staff. Apgar 1, 5, 7  Cord gases collected.    Pitocin infused via IV bolus. Signs of placenta separation noted and gentle cord traction was completed. Intact placenta was delivered spontaneously at 0000 where 3VC was noted. EBL 250ml. Fundus firm with minimal massage. Inspection of vulva and vagina was completed and vagina intact. Routine postpartum care was initiated  Infant weight is 3085g        Desire ALBARRAN CNM/ Dr. Bowden,  Attending

## 2023-08-26 NOTE — PROGRESS NOTES
Admitted from labor and delivery, post vag delivery in satisfactory condition. Came via wheelchair and placed to bed comfortably. IVF of LR with 10 units of pitocin infusing well into right hand. Assessment done, fundus firm one fingerbreadth above Umbilicus, lochia rubra minimal. Denies pain at this time, plan of care on going. Oriented to room and call light place within reach. Will continue to monitor.

## 2023-08-27 ENCOUNTER — PHARMACY VISIT (OUTPATIENT)
Dept: PHARMACY | Facility: MEDICAL CENTER | Age: 38
End: 2023-08-27
Payer: COMMERCIAL

## 2023-08-27 VITALS
OXYGEN SATURATION: 97 % | HEIGHT: 57 IN | SYSTOLIC BLOOD PRESSURE: 102 MMHG | RESPIRATION RATE: 18 BRPM | TEMPERATURE: 97.6 F | HEART RATE: 73 BPM | WEIGHT: 135 LBS | BODY MASS INDEX: 29.12 KG/M2 | DIASTOLIC BLOOD PRESSURE: 66 MMHG

## 2023-08-27 PROBLEM — O09.523 ADVANCED MATERNAL AGE IN MULTIGRAVIDA, THIRD TRIMESTER: Status: RESOLVED | Noted: 2023-07-03 | Resolved: 2023-08-27

## 2023-08-27 PROCEDURE — RXMED WILLOW AMBULATORY MEDICATION CHARGE: Performed by: ADVANCED PRACTICE MIDWIFE

## 2023-08-27 PROCEDURE — A9270 NON-COVERED ITEM OR SERVICE: HCPCS | Performed by: ADVANCED PRACTICE MIDWIFE

## 2023-08-27 PROCEDURE — 700102 HCHG RX REV CODE 250 W/ 637 OVERRIDE(OP): Performed by: ADVANCED PRACTICE MIDWIFE

## 2023-08-27 RX ORDER — IBUPROFEN 800 MG/1
800 TABLET ORAL EVERY 8 HOURS PRN
Qty: 30 TABLET | Refills: 0 | Status: SHIPPED | OUTPATIENT
Start: 2023-08-27

## 2023-08-27 RX ORDER — ACETAMINOPHEN 500 MG
1000 TABLET ORAL EVERY 6 HOURS PRN
Qty: 30 TABLET | Refills: 0 | Status: SHIPPED | OUTPATIENT
Start: 2023-08-27

## 2023-08-27 RX ADMIN — ACETAMINOPHEN 1000 MG: 500 TABLET, FILM COATED ORAL at 06:25

## 2023-08-27 RX ADMIN — PRENATAL WITH FERROUS FUM AND FOLIC ACID 1 TABLET: 3080; 920; 120; 400; 22; 1.84; 3; 20; 10; 1; 12; 200; 27; 25; 2 TABLET ORAL at 07:58

## 2023-08-27 RX ADMIN — IBUPROFEN 800 MG: 800 TABLET, FILM COATED ORAL at 06:25

## 2023-08-27 ASSESSMENT — EDINBURGH POSTNATAL DEPRESSION SCALE (EPDS)
I HAVE FELT SAD OR MISERABLE: NO, NOT AT ALL
I HAVE LOOKED FORWARD WITH ENJOYMENT TO THINGS: AS MUCH AS I EVER DID
THINGS HAVE BEEN GETTING ON TOP OF ME: NO, I HAVE BEEN COPING AS WELL AS EVER
I HAVE BEEN SO UNHAPPY THAT I HAVE BEEN CRYING: NO, NEVER
I HAVE BEEN SO UNHAPPY THAT I HAVE HAD DIFFICULTY SLEEPING: NOT AT ALL
THE THOUGHT OF HARMING MYSELF HAS OCCURRED TO ME: NEVER
I HAVE BEEN ABLE TO LAUGH AND SEE THE FUNNY SIDE OF THINGS: AS MUCH AS I ALWAYS COULD
I HAVE FELT SCARED OR PANICKY FOR NO GOOD REASON: NO, NOT AT ALL
I HAVE BEEN ANXIOUS OR WORRIED FOR NO GOOD REASON: NO, NOT AT ALL
I HAVE BLAMED MYSELF UNNECESSARILY WHEN THINGS WENT WRONG: NO, NEVER

## 2023-08-27 ASSESSMENT — PAIN DESCRIPTION - PAIN TYPE: TYPE: ACUTE PAIN

## 2023-08-27 NOTE — DISCHARGE INSTRUCTIONS

## 2023-08-27 NOTE — DISCHARGE SUMMARY
Discharge Summary:      Isabel Galarza    Admit Date:   2023  Discharge Date:  2023     Admitting diagnosis:  Indication for care in labor or delivery [O75.9]  Discharge Diagnosis: Status post vaginal, spontaneous.  Pregnancy Complications: AMA  Tubal Ligation:  no        History:  History reviewed. No pertinent past medical history.  OB History    Para Term  AB Living   10 7 7   3 7   SAB IAB Ectopic Molar Multiple Live Births   2   1   0 7      # Outcome Date GA Lbr Julian/2nd Weight Sex Delivery Anes PTL Lv   10 Term 23 39w1d  3.085 kg (6 lb 12.8 oz) F Vag-Spont EPI N DARIUS   9 Term 17 39w4d  3.17 kg (6 lb 15.8 oz) F Vag-Spont   DARIUS   8 2014     SAB         Birth Comments: Baby passed on its own.   7 2013     SAB         Birth Comments: Baby passed on  it own   6 Term 11/23/10 40w0d   M Vag-Spont EPI N DARIUS   5 Term 08/10/09 39w1d 08:00 3.26 kg (7 lb 3 oz) F Vag-Spont None N DARIUS   4 Term 10/23/07 40w0d 08:00  M Vag-Spont None N DARIUS   3 Term 06 38w2d 10:00  F Vag-Spont None N DARIUS   2 Ectopic 2005 5w0d          1 Term 04/10/02 40w0d 15:00  F Vag-Spont None N DARIUS        Bloodless, Kiwi extract, Pineapple, and Sulfa drugs  Patient Active Problem List    Diagnosis Date Noted    ASCUS with positive high risk human papillomavirus of vagina 2023    Grand multiparity 2023        Hospital Course:   38 y.o. , now para 7, was admitted due to non reactive NST in office and early labor. She underwent labor augmentation and delivered vaginally. Patient postpartum course was unremarkable, with progressive advancement in diet , ambulation and toleration of oral analgesia. Patient without complaints today and desires discharge.      Vitals:    23 1000 23 1742 23 2150 23 0621   BP: 117/69 97/59 103/62 102/66   Pulse: 69 63 64 73   Resp: 18 18 17 18   Temp: 36.6 °C (97.8 °F) 36.4 °C (97.5 °F) 36.5 °C (97.7 °F) 36.4 °C (97.6 °F)    TempSrc: Temporal Temporal Temporal Temporal   SpO2: 98% 98% 99% 97%   Weight:       Height:           Current Facility-Administered Medications   Medication Dose    lactated ringers infusion      docusate sodium (Colace) capsule 100 mg  100 mg    ibuprofen (Motrin) tablet 800 mg  800 mg    acetaminophen (Tylenol) tablet 1,000 mg  1,000 mg    tetanus-dipth-acell pertussis (Tdap) inj 0.5 mL  0.5 mL    prenatal plus vitamin (Stuartnatal 1+1) 27-1 MG tablet 1 Tablet  1 Tablet    LR infusion      oxytocin (Pitocin) infusion (for post delivery)  125 mL/hr    oxytocin (Pitocin) 0.02 Units/mL LR (induction of labor)  0.5-20 edy-units/min    ropivacaine 0.2 % (Naropin) injection         Exam:  Breast Exam: negative  Abdomen: Abdomen soft, non-tender. BS normal. No masses,  No organomegaly  Fundus Non Tender: yes  Incision: none  Perineum: perineum intact  Extremity: extremities, peripheral pulses and reflexes normal     Labs:  Recent Labs     08/25/23  1315 08/26/23  1040   WBC 16.3* 22.9*   RBC 4.34 3.80*   HEMOGLOBIN 13.9 12.1   HEMATOCRIT 39.4 36.0*   MCV 90.8 94.7   MCH 32.0 31.8   MCHC 35.3 33.6   RDW 46.9 50.2*   PLATELETCT 223 184   MPV 11.0 10.9        Activity:   Discharge to home  Pelvic Rest x 6 weeks    Assessment:  normal postpartum course  Discharge Assessment: No areas of skin breakdown/redness; surgical incision intact/healing     [facility]: [facility] in [facility] weeks for vaginal      Discharge Meds:   Current Outpatient Medications   Medication Sig Dispense Refill    ibuprofen (MOTRIN) 800 MG Tab Take 1 Tablet by mouth every 8 hours as needed for Moderate Pain. 30 Tablet 0    acetaminophen (TYLENOL) 500 MG Tab Take 2 Tablets by mouth every 6 hours as needed for Mild Pain. 30 Tablet 0     I reviewed in detail with patient all indications that would necessitate emergency care. We reviewed bleeding expectations as well as expected healing with perineal repairs. We discussed birth control options and  she is aware that can order this at her 6 week PP appointment. Finally, we reviewed postpartum depression and anxiety. She verbalizes understanding.  I have encouraged pelvic rest and use of condom if she does desire to have intercourse.      ETHAN Gomez

## 2023-08-27 NOTE — CARE PLAN
The patient is Stable - Low risk of patient condition declining or worsening    Shift Goals  Clinical Goals: rest, pain control, VSS  Patient Goals: healthy baby, healthy mom    Progress made toward(s) clinical / shift goals:    Problem: Pain - Standard  Goal: Alleviation of pain or a reduction in pain to the patient’s comfort goal  Outcome: Progressing  Note: Pain level within patient comfort goal.      Problem: Knowledge Deficit - Postpartum  Goal: Patient will verbalize and demonstrate understanding of self and infant care  Outcome: Progressing  Note: Patient able to care for self and infant.        Patient is not progressing towards the following goals:

## 2023-08-27 NOTE — DISCHARGE PLANNING
Discharge Planning Assessment Post Partum      Reason for Referral: Hx of THC use in the past year.  +UDS fentanyl; MOB given fentanyl during labor   Address: [address] [place] [address] [address] [place] [address]  Type of Living Situation:Apartment    Mom Diagnosis: Post partum    Baby Diagnosis: [place]    Primary Language: English       Name of Baby: Cally Galarza   Father of the Baby: Des Hall   Involved in baby's care? Yes, per MOB.   Contact Information: MOB unable to provide contact information      [facility]   Mom's PCP: MOB denies having a PCP   PCP for new baby:Not yet decided-pediatrician list provided.       Support System: Friends and family   Coping/Bonding between mother & baby: appropriate    Source of Feeding: Breastfeeding    Supplies for Infant: yes       Mom's Insurance: [employer].    Baby Covered on Insurance:Yes    Mother Employed/School: [employer]    Other children in the home/names & ages: 1 year old       Financial Hardship/Income: None indicated     Mom's Mental status: Appropriate    Services used prior to admit: None indicated       CPS History: MOB denies    Psychiatric History: MOB denies    Domestic Violence History: MOB denies    Drug/ETOH History: MOB denies       Resources Provided: [facility] Pediatrician list provided.    Referrals Made: None        Clearance for Discharge: Baby is cleared to discharge home with MOB      Ongoing Plan: SW will continue to monitor and assist with dc planning as needed.

## 2023-08-27 NOTE — PROGRESS NOTES
Received report from Hiral RIDLEY. Educated pt on today's POC. All questions answered at this time. Call light within reach.   Educated pt on discharge paperwork. Pt verbalized understanding. All questions answered. Bands verified, cuddles removed.

## 2023-08-27 NOTE — LACTATION NOTE
FAYE reports that she is now sore and broken down from infant latching. She states it happed with both her other children, therefore, her plan is to wait to attempt latch and may as with her others, pump and provide. She is currently formula feeding her infant until her milk comes in and is pumping with her own pump she brought with her. The last infant she pump/provided for 1 year. She denies need for assist with latch, and denies need for assessment of breasts/nipples. Recommend Soothies gel pads and silver nipple shields for purchase to assist with healing. She has lanolin and also was taught hand express to air dry for healing. She has contacted [facility] and is in process to getting approved. Outpatient lactation support rsource list given with enc to attend the [facility]. FAYE voices understanding.

## 2024-11-07 ENCOUNTER — HOSPITAL ENCOUNTER (OUTPATIENT)
Facility: MEDICAL CENTER | Age: 39
End: 2024-11-07
Payer: MEDICAID

## 2024-11-07 ENCOUNTER — OFFICE VISIT (OUTPATIENT)
Dept: URGENT CARE | Facility: CLINIC | Age: 39
End: 2024-11-07
Payer: MEDICAID

## 2024-11-07 VITALS
SYSTOLIC BLOOD PRESSURE: 106 MMHG | HEART RATE: 80 BPM | TEMPERATURE: 97.2 F | RESPIRATION RATE: 14 BRPM | OXYGEN SATURATION: 97 % | DIASTOLIC BLOOD PRESSURE: 64 MMHG | BODY MASS INDEX: 27.68 KG/M2 | HEIGHT: 57 IN | WEIGHT: 128.3 LBS

## 2024-11-07 DIAGNOSIS — R11.2 NAUSEA AND VOMITING, UNSPECIFIED VOMITING TYPE: ICD-10-CM

## 2024-11-07 DIAGNOSIS — N30.00 ACUTE CYSTITIS WITHOUT HEMATURIA: ICD-10-CM

## 2024-11-07 DIAGNOSIS — Z3A.01 LESS THAN 8 WEEKS GESTATION OF PREGNANCY: ICD-10-CM

## 2024-11-07 DIAGNOSIS — R30.9 PAINFUL URINATION: ICD-10-CM

## 2024-11-07 LAB
APPEARANCE UR: NORMAL
BILIRUB UR STRIP-MCNC: NORMAL MG/DL
COLOR UR AUTO: NORMAL
GLUCOSE UR STRIP.AUTO-MCNC: NEGATIVE MG/DL
KETONES UR STRIP.AUTO-MCNC: NORMAL MG/DL
LEUKOCYTE ESTERASE UR QL STRIP.AUTO: NORMAL
NITRITE UR QL STRIP.AUTO: POSITIVE
PH UR STRIP.AUTO: 6.5 [PH] (ref 5–8)
POCT INT CON NEG: NEGATIVE
POCT INT CON POS: POSITIVE
POCT URINE PREGNANCY TEST: POSITIVE
PROT UR QL STRIP: NORMAL MG/DL
RBC UR QL AUTO: NORMAL
SP GR UR STRIP.AUTO: 1.03
UROBILINOGEN UR STRIP-MCNC: 1 MG/DL

## 2024-11-07 PROCEDURE — 87077 CULTURE AEROBIC IDENTIFY: CPT

## 2024-11-07 PROCEDURE — 87186 SC STD MICRODIL/AGAR DIL: CPT

## 2024-11-07 PROCEDURE — 3078F DIAST BP <80 MM HG: CPT

## 2024-11-07 PROCEDURE — 81025 URINE PREGNANCY TEST: CPT

## 2024-11-07 PROCEDURE — 99214 OFFICE O/P EST MOD 30 MIN: CPT

## 2024-11-07 PROCEDURE — 3074F SYST BP LT 130 MM HG: CPT

## 2024-11-07 PROCEDURE — 87086 URINE CULTURE/COLONY COUNT: CPT

## 2024-11-07 PROCEDURE — 81002 URINALYSIS NONAUTO W/O SCOPE: CPT

## 2024-11-07 RX ORDER — CEPHALEXIN 500 MG/1
500 CAPSULE ORAL 2 TIMES DAILY
Qty: 14 CAPSULE | Refills: 0 | Status: SHIPPED | OUTPATIENT
Start: 2024-11-07 | End: 2024-11-07

## 2024-11-07 RX ORDER — ONDANSETRON 4 MG/1
4 TABLET, ORALLY DISINTEGRATING ORAL EVERY 6 HOURS PRN
Qty: 15 TABLET | Refills: 0 | Status: SHIPPED | OUTPATIENT
Start: 2024-11-07

## 2024-11-07 RX ORDER — CEPHALEXIN 500 MG/1
500 CAPSULE ORAL 4 TIMES DAILY
Qty: 20 CAPSULE | Refills: 0 | Status: SHIPPED | OUTPATIENT
Start: 2024-11-07 | End: 2024-11-12

## 2024-11-07 ASSESSMENT — FIBROSIS 4 INDEX: FIB4 SCORE: 0.95

## 2024-11-07 NOTE — LETTER
November 7, 2024         Patient: Isabel Galarza   YOB: 1985   Date of Visit: 11/7/2024           To Whom it May Concern:    Isabel Galarza was seen in my clinic on 11/7/2024.     If you have any questions or concerns, please don't hesitate to call.        Sincerely,           HILTON Durant.  Electronically Signed

## 2024-11-08 NOTE — PROGRESS NOTES
Subjective:   Isabel Galarza is a 39 y.o. female who presents for Emesis (Loss of appetite, foul smell urine, chills x 2 days )      HPI:    Patient presents to urgent care with concerns of UTI.  Symptoms started two days ago. Reports dysuria, urinary urgency and hesitancy. Reports urine has been very malodorous   She also reports nausea, vomiting, and loss of appetite.   Denies fever, chills, flank pain, abdominal pain.  Tolerating some fluids. Denies history of renal stones.   Denies vaginal discharge. Denies concerns for STDs. Denies rash.    ROS As above in HPI    Medications:    Current Outpatient Medications on File Prior to Visit   Medication Sig Dispense Refill    ibuprofen (MOTRIN) 800 MG Tab Take 1 Tablet by mouth every 8 hours as needed for Moderate Pain. 30 Tablet 0    acetaminophen (TYLENOL) 500 MG Tab Take 2 Tablets by mouth every 6 hours as needed for Mild Pain. 30 Tablet 0    Prenatal MV-Min-Fe Fum-FA-DHA (PRENATAL 1 PO) Take  by mouth. (Patient not taking: Reported on 11/7/2024)       No current facility-administered medications on file prior to visit.        Allergies:   Bloodless, Kiwi extract, Pineapple, and Sulfa drugs    Problem List:   Patient Active Problem List   Diagnosis    Grand multiparity    ASCUS with positive high risk human papillomavirus of vagina        Surgical History:  Past Surgical History:   Procedure Laterality Date    TUBE & ECTOPIC PREG., REMOVAL  2006    GYN SURGERY         Past Social Hx:   Social History     Tobacco Use    Smoking status: Never    Smokeless tobacco: Never   Vaping Use    Vaping status: Former   Substance Use Topics    Alcohol use: Not Currently     Comment: socially    Drug use: Not Currently     Types: Marijuana     Comment: occ          Problem list, medications, and allergies reviewed by myself today in Epic.     Objective:     /64 (BP Location: Right arm, Patient Position: Sitting, BP Cuff Size: Adult long)   Pulse 80   Temp 36.2 °C  "(97.2 °F) (Temporal)   Resp 14   Ht 1.448 m (4' 9\")   Wt 58.2 kg (128 lb 4.8 oz)   LMP 10/01/2024 (Approximate)   SpO2 97%   Breastfeeding No   BMI 27.76 kg/m²     Physical Exam  Vitals and nursing note reviewed.   Constitutional:       Appearance: Normal appearance.   HENT:      Head: Normocephalic.   Cardiovascular:      Rate and Rhythm: Normal rate and regular rhythm.      Heart sounds: Normal heart sounds.   Pulmonary:      Effort: Pulmonary effort is normal.      Breath sounds: Normal breath sounds.   Abdominal:      General: Bowel sounds are normal. There is no distension.      Palpations: Abdomen is soft.      Tenderness: There is no abdominal tenderness. There is no right CVA tenderness, left CVA tenderness, guarding or rebound.   Genitourinary:     Comments: Deferred  Neurological:      Mental Status: She is alert and oriented to person, place, and time.         Assessment/Plan:       Results for orders placed or performed in visit on 11/07/24   POCT Urinalysis    Collection Time: 11/07/24  6:35 PM   Result Value Ref Range    POC Color OTHER Negative    POC Appearance CLOUDY Negative    POC Glucose NEGATIVE Negative mg/dL    POC Bilirubin SMALL Negative mg/dL    POC Ketones TRACE Negative mg/dL    POC Specific Gravity 1.030 <1.005 - >1.030    POC Blood MODERATE Negative    POC Urine PH 6.5 5.0 - 8.0    POC Protein 100 MG/DL Negative mg/dL    POC Urobiligen 1.0 Negative (0.2) mg/dL    POC Nitrites POSITIVE Negative    POC Leukocyte Esterase LARGE Negative   POCT Pregnancy    Collection Time: 11/07/24  6:50 PM   Result Value Ref Range    POC Urine Pregnancy Test Positive     Internal Control Positive Positive     Internal Control Negative Negative        Diagnosis and associated orders:   1. Painful urination  - POCT Urinalysis    2. Acute cystitis without hematuria  - URINE CULTURE(NEW); Future  - cephALEXin (KEFLEX) 500 MG Cap; Take 1 Capsule by mouth 4 times a day for 5 days.  Dispense: 20 Capsule; " Refill: 0    3. Nausea and vomiting, unspecified vomiting type  - ondansetron (ZOFRAN ODT) 4 MG TABLET DISPERSIBLE; Take 1 Tablet by mouth every 6 hours as needed for Nausea/Vomiting for up to 15 doses.  Dispense: 15 Tablet; Refill: 0  - POCT Pregnancy    4. Less than 8 weeks gestation of pregnancy  - cephALEXin (KEFLEX) 500 MG Cap; Take 1 Capsule by mouth 4 times a day for 5 days.  Dispense: 20 Capsule; Refill: 0  - Referral to OB/Gyn          Comments/MDM:     UA: +blood, +nitrite, +Le, +protein  POC hcg is positive and patient was unaware but happy about the results.  Nauseated and having emesis in office. Zofran administered which controlled nausea and emesis.   VSS, afebrile, no cva tenderness.  Rx: keflex.  Recommend increased oral hydration, Tylenol per package instructions, rest  She states she is established with OB, but will refer her to OB in case she needs a new referral.       Return to clinic or go to ED if symptoms worsen or persist. Indications for ED discussed at length. Patient/Parent/Guardian voices understanding. Follow-up with your primary care provider in 3-5 days. Red flag symptoms discussed. All side effects of medication discussed including allergic response, GI upset, tendon injury, rash, sedation etc.    Please note that this dictation was created using voice recognition software. I have made a reasonable attempt to correct obvious errors, but I expect that there are errors of grammar and possibly content that I did not discover before finalizing the note.    This note was electronically signed by DEION Summers

## 2024-11-09 LAB
BACTERIA UR CULT: ABNORMAL
BACTERIA UR CULT: ABNORMAL
SIGNIFICANT IND 70042: ABNORMAL
SITE SITE: ABNORMAL
SOURCE SOURCE: ABNORMAL

## 2024-12-03 ENCOUNTER — HOSPITAL ENCOUNTER (OUTPATIENT)
Facility: MEDICAL CENTER | Age: 39
End: 2024-12-03
Attending: NURSE PRACTITIONER
Payer: MEDICAID

## 2024-12-03 ENCOUNTER — OFFICE VISIT (OUTPATIENT)
Dept: URGENT CARE | Facility: CLINIC | Age: 39
End: 2024-12-03
Payer: MEDICAID

## 2024-12-03 VITALS
HEART RATE: 82 BPM | OXYGEN SATURATION: 98 % | RESPIRATION RATE: 16 BRPM | BODY MASS INDEX: 28.05 KG/M2 | HEIGHT: 57 IN | TEMPERATURE: 97.8 F | WEIGHT: 130 LBS | DIASTOLIC BLOOD PRESSURE: 42 MMHG | SYSTOLIC BLOOD PRESSURE: 110 MMHG

## 2024-12-03 DIAGNOSIS — N30.01 ACUTE CYSTITIS WITH HEMATURIA: ICD-10-CM

## 2024-12-03 DIAGNOSIS — O21.0 HYPEREMESIS OF PREGNANCY: ICD-10-CM

## 2024-12-03 DIAGNOSIS — O23.41 URINARY TRACT INFECTION IN MOTHER DURING FIRST TRIMESTER OF PREGNANCY: ICD-10-CM

## 2024-12-03 LAB
APPEARANCE UR: CLEAR
BILIRUB UR STRIP-MCNC: NEGATIVE MG/DL
COLOR UR AUTO: YELLOW
GLUCOSE UR STRIP.AUTO-MCNC: NEGATIVE MG/DL
KETONES UR STRIP.AUTO-MCNC: NEGATIVE MG/DL
LEUKOCYTE ESTERASE UR QL STRIP.AUTO: NORMAL
NITRITE UR QL STRIP.AUTO: NEGATIVE
PH UR STRIP.AUTO: 6 [PH] (ref 5–8)
PROT UR QL STRIP: 30 MG/DL
RBC UR QL AUTO: NORMAL
SP GR UR STRIP.AUTO: 1.03
UROBILINOGEN UR STRIP-MCNC: 2 MG/DL

## 2024-12-03 PROCEDURE — 87086 URINE CULTURE/COLONY COUNT: CPT

## 2024-12-03 PROCEDURE — 3078F DIAST BP <80 MM HG: CPT | Performed by: NURSE PRACTITIONER

## 2024-12-03 PROCEDURE — 3074F SYST BP LT 130 MM HG: CPT | Performed by: NURSE PRACTITIONER

## 2024-12-03 PROCEDURE — 87077 CULTURE AEROBIC IDENTIFY: CPT

## 2024-12-03 PROCEDURE — 87186 SC STD MICRODIL/AGAR DIL: CPT

## 2024-12-03 PROCEDURE — 81002 URINALYSIS NONAUTO W/O SCOPE: CPT | Performed by: NURSE PRACTITIONER

## 2024-12-03 PROCEDURE — 99214 OFFICE O/P EST MOD 30 MIN: CPT | Performed by: NURSE PRACTITIONER

## 2024-12-03 RX ORDER — CEPHALEXIN 500 MG/1
500 CAPSULE ORAL 4 TIMES DAILY
Qty: 28 CAPSULE | Refills: 0 | Status: SHIPPED | OUTPATIENT
Start: 2024-12-03 | End: 2024-12-10

## 2024-12-03 RX ORDER — DOXYLAMINE SUCCINATE AND PYRIDOXINE HYDROCHLORIDE, DELAYED RELEASE TABLETS 10 MG/10 MG 10; 10 MG/1; MG/1
2 TABLET, DELAYED RELEASE ORAL NIGHTLY
Qty: 60 TABLET | Refills: 0 | Status: SHIPPED | OUTPATIENT
Start: 2024-12-03 | End: 2025-01-02

## 2024-12-03 ASSESSMENT — ENCOUNTER SYMPTOMS
FLANK PAIN: 0
VOMITING: 1
NAUSEA: 1
BACK PAIN: 1

## 2024-12-03 ASSESSMENT — FIBROSIS 4 INDEX: FIB4 SCORE: 0.95

## 2024-12-04 ENCOUNTER — TELEPHONE (OUTPATIENT)
Dept: URGENT CARE | Facility: CLINIC | Age: 39
End: 2024-12-04
Payer: MEDICAID

## 2024-12-04 NOTE — PATIENT INSTRUCTIONS
-Small frequent meals. Diet as tolerated. Can try an initial bland diet, BRAT diet and advance as tolerated. Avoid triggers.  -Fluids are better tolerated if cold, clear, and carbonated or sour.   -Avoid lying down after eating.  -Soraya.  -Vitamin B-6 (pyridoxine). If nausea persists, can take doxylamine-pyridoxine.  -Oral Hydration: Drink plenty of water.  -Follow up with OB/GYN.    Follow up emergently in the ER for abdominal pain, vaginal bleeding, weakness, confusion, dizziness, tachycardia, decreased urine volume, flank pain, or if unable to keep fluids down.    -Can take doxylamine-pyridoxine. Doxylamine succinate 10 mg and pyridoxine 10 mg may be given separately or as a combination pill. Start with 20 mg of each drug at bedtime. If ineffective, take 10 mg of each drug in the morning and in the afternoon.

## 2024-12-04 NOTE — TELEPHONE ENCOUNTER
Pt called regarding rx DOXYLAMINE. Pt states she had trouble with insurance not covering rx. She is requesting a generic brand if possible. Please advise.

## 2024-12-04 NOTE — PROGRESS NOTES
Subjective:     Isabel Galarza is a 39 y.o. female who presents for UTI (hyperemesis and possible UTI x 1 month )      Presents with UTI symptoms, that have returned in the last week. States her LMP was the 1st week October or last week of September. States she has not established a OB to make an appointment, as she's looking for someone in [place]. Denies vaginal bleeding. States she's had normal pregnancy cramps. Denies a vaginal itch or abnormal discharge. No concerns for a STI.     UTI  This is a recurrent problem. The current episode started more than 1 month ago. The problem has been waxing and waning. Associated symptoms include nausea, urinary symptoms and vomiting. Pertinent negatives include no fever.       History reviewed. No pertinent past medical history.    Past Surgical History:   Procedure Laterality Date    TUBE & ECTOPIC PREG., REMOVAL  2006    GYN SURGERY         Social History     Socioeconomic History    Marital status: Single     Spouse name: Not on file    Number of children: Not on file    Years of education: Not on file    Highest education level: Not on file   Occupational History    Not on file   Tobacco Use    Smoking status: Never    Smokeless tobacco: Never   Vaping Use    Vaping status: Former   Substance and Sexual Activity    Alcohol use: Not Currently     Comment: socially    Drug use: Not Currently     Types: Marijuana     Comment: occ    Sexual activity: Yes     Partners: Male     Birth control/protection: None     Comment: no birth control   Other Topics Concern    Not on file   Social History Narrative    Not on file     Social Drivers of Health     Financial Resource Strain: Not on file   Food Insecurity: Not on file   Transportation Needs: Not on file   Physical Activity: Not on file   Stress: Not on file   Social Connections: Not on file   Intimate Partner Violence: Not on file   Housing Stability: Not on file        Family History   Problem Relation Age of Onset     "No Known Problems Mother     No Known Problems Father         Allergies   Allergen Reactions    Bloodless      Will not accept any kind of blood product that is not her own even in the case of emergency    Kiwi Extract Swelling    Pineapple Swelling    Sulfa Drugs Hives     \"when I was in [place]\"  \"I looked like a lobster\"       Review of Systems   Constitutional:  Negative for fever.   Gastrointestinal:  Positive for nausea and vomiting.   Genitourinary:  Positive for frequency. Negative for flank pain and hematuria.   Musculoskeletal:  Positive for back pain.   All other systems reviewed and are negative.       Objective:   /42   Pulse 82   Temp 36.6 °C (97.8 °F)   Resp 16   Ht 1.448 m (4' 9\")   Wt 59 kg (130 lb)   LMP 10/01/2024 (Approximate)   SpO2 98%   BMI 28.13 kg/m²     Physical Exam  Vitals reviewed.   Constitutional:       General: She is not in acute distress.     Appearance: She is not toxic-appearing.   HENT:      Mouth/Throat:      Mouth: Mucous membranes are moist.   Cardiovascular:      Rate and Rhythm: Normal rate.   Pulmonary:      Effort: Pulmonary effort is normal. No respiratory distress.   Abdominal:      Palpations: Abdomen is soft.      Tenderness: There is no abdominal tenderness. There is no right CVA tenderness or left CVA tenderness.   Skin:     General: Skin is warm.   Neurological:      Mental Status: She is alert and oriented to person, place, and time.         Assessment/Plan:   1. Urinary tract infection in mother during first trimester of pregnancy  - POCT Urinalysis  - Referral to establish with PCP  - URINE CULTURE(NEW); Future  - cephALEXin (KEFLEX) 500 MG Cap; Take 1 Capsule by mouth 4 times a day for 7 days.  Dispense: 28 Capsule; Refill: 0    2. Acute cystitis with hematuria  - POCT Urinalysis  - Referral to establish with PCP  - URINE CULTURE(NEW); Future  - cephALEXin (KEFLEX) 500 MG Cap; Take 1 Capsule by mouth 4 times a day for 7 days.  Dispense: 28 Capsule; " Refill: 0    3. Hyperemesis of pregnancy  - Referral to establish with PCP  - Doxylamine-Pyridoxine 10-10 MG Tablet Delayed Response delayed-release tablet; Take 2 Tablets by mouth every evening for 30 days.  Dispense: 60 Tablet; Refill: 0    Results for orders placed or performed in visit on 12/03/24   POCT Urinalysis    Collection Time: 12/03/24  6:52 PM   Result Value Ref Range    POC Color yellow Negative    POC Appearance clear Negative    POC Glucose negative Negative mg/dL    POC Bilirubin negative Negative mg/dL    POC Ketones negative Negative mg/dL    POC Specific Gravity 1.030 <1.005 - >1.030    POC Blood trace-intact Negative    POC Urine PH 6.0 5.0 - 8.0    POC Protein 30 Negative mg/dL    POC Urobiligen 2.0 Negative (0.2) mg/dL    POC Nitrites negative Negative    POC Leukocyte Esterase small Negative     -Small frequent meals. Diet as tolerated. Can try an initial bland diet, BRAT diet and advance as tolerated. Avoid triggers.  -Fluids are better tolerated if cold, clear, and carbonated or sour.   -Avoid lying down after eating.  -Soraya.  -Vitamin B-6 (pyridoxine). If nausea persists, can take doxylamine-pyridoxine.  -Oral Hydration: Drink plenty of water.  -Follow up with OB/GYN.    Follow up emergently in the ER for abdominal pain, vaginal bleeding, weakness, confusion, dizziness, tachycardia, decreased urine volume, flank pain, or if unable to keep fluids down.    Stable vitals. Denies abdominal pain or irregular vaginal bleeding. Encouraged oral hydration, and establishing a OB as previously discussed.      Dx: Acute cystitis with hematuria    Specimen Information: Urine   0 Result Notes      Component 6 d ago   Significant Indicator POS Positive (POS)   Source UR   Site -   Culture Result - Abnormal    Culture Result  Abnormal   Enterobacter cloacae complex  >100,000 cfu/mL    Resulting Agency [facility]        Susceptibility     Enterobacter cloacae complex     JOSE     Amoxicillin/CA >16/8 mcg/mL  Resistant     Ampicillin >16 mcg/mL Resistant     Ampicillin/sulbactam >16/8 mcg/mL Resistant     Cefazolin >16 mcg/mL Resistant 1     Cefepime <=2 mcg/mL Sensitive     Ceftriaxone <=1 mcg/mL Sensitive     Cefuroxime >16 mcg/mL Resistant     Ciprofloxacin <=0.25 mcg/mL Sensitive     Gentamicin <=2 mcg/mL Sensitive     Levofloxacin <=0.5 mcg/mL Sensitive     Meropenem <=1 mcg/mL Sensitive     Meropenem/Vaborbactam <=2 mcg/mL Sensitive     Minocycline <=4 mcg/mL Sensitive     Nitrofurantoin 64 mcg/mL Intermediate     Pip/Tazobactam <=8 mcg/mL Sensitive     Tigecycline <=2 mcg/mL Sensitive     Tobramycin <=2 mcg/mL Sensitive     Trimeth/Sulfa <=0.5/9.5 m... Sensitive              1 Breakpoints when Cefazolin is used for therapy of infections  other than uncomplicated UTIs due to Enterobacterales are as  follows:  JOSE and Interpretation:  <=2 S  4 I  >=8 R            Due to resistance and first trimester pregnancy, there are no other oral options. Cephalosporins are insufficient, patient has a sulfa allergy, and flouroquinolones are contraindicated. In an attempt to keep the patient from hospitalization, an order will be placed for the infusion center for a one time dose of aminoglycoside. Consulted with infectious disease pharmacist.     Differential diagnosis, natural history, supportive care, and indications for immediate follow-up discussed.

## 2024-12-09 ENCOUNTER — TELEPHONE (OUTPATIENT)
Dept: URGENT CARE | Facility: CLINIC | Age: 39
End: 2024-12-09
Payer: MEDICAID

## 2024-12-09 DIAGNOSIS — O23.11 CYSTITIS OF PREGNANCY IN FIRST TRIMESTER: ICD-10-CM

## 2024-12-09 ASSESSMENT — ENCOUNTER SYMPTOMS: FEVER: 0

## 2024-12-13 ENCOUNTER — OUTPATIENT INFUSION SERVICES (OUTPATIENT)
Dept: ONCOLOGY | Facility: MEDICAL CENTER | Age: 39
End: 2024-12-13
Attending: NURSE PRACTITIONER
Payer: MEDICAID

## 2024-12-13 VITALS
RESPIRATION RATE: 16 BRPM | DIASTOLIC BLOOD PRESSURE: 68 MMHG | WEIGHT: 125.66 LBS | HEART RATE: 82 BPM | TEMPERATURE: 98.2 F | SYSTOLIC BLOOD PRESSURE: 112 MMHG | BODY MASS INDEX: 24.67 KG/M2 | OXYGEN SATURATION: 96 % | HEIGHT: 60 IN

## 2024-12-13 DIAGNOSIS — O23.11 CYSTITIS OF PREGNANCY IN FIRST TRIMESTER: ICD-10-CM

## 2024-12-13 PROCEDURE — 96365 THER/PROPH/DIAG IV INF INIT: CPT

## 2024-12-13 PROCEDURE — 700105 HCHG RX REV CODE 258: Mod: UD | Performed by: NURSE PRACTITIONER

## 2024-12-13 PROCEDURE — 700111 HCHG RX REV CODE 636 W/ 250 OVERRIDE (IP): Mod: UD | Performed by: NURSE PRACTITIONER

## 2024-12-13 RX ADMIN — GENTAMICIN SULFATE 250 MG: 40 INJECTION, SOLUTION INTRAMUSCULAR; INTRAVENOUS at 16:26

## 2024-12-13 ASSESSMENT — FIBROSIS 4 INDEX
FIB4 SCORE: 0.95
FIB4 SCORE: 0.95

## 2024-12-14 NOTE — PROGRESS NOTES
Pt presented to [facility] for one time gentamicin for cystitis. She is pregnant. POC discussed and pt verbalized understanding.      PIV established without difficulty in the right AC; pt tolerated well. Gentamicin administered per MAR; pt tolerated well.  No signs or symptoms of reaction or complications noted. PIV flushed and removed with tip intact. Gauze and paper tape applied to site.      Follow-up care discussed and she will be following up with ordering provider. Pt discharged home to self care in no apparent distress at this time.

## 2025-01-27 ENCOUNTER — TELEPHONE (OUTPATIENT)
Dept: SCHEDULING | Facility: IMAGING CENTER | Age: 40
End: 2025-01-27
Payer: MEDICAID

## 2025-04-14 ENCOUNTER — APPOINTMENT (OUTPATIENT)
Dept: OBGYN | Facility: CLINIC | Age: 40
End: 2025-04-14
Payer: MEDICAID

## 2025-04-14 ENCOUNTER — HOSPITAL ENCOUNTER (OUTPATIENT)
Facility: MEDICAL CENTER | Age: 40
End: 2025-04-14
Attending: NURSE PRACTITIONER
Payer: MEDICAID

## 2025-04-14 ENCOUNTER — INITIAL PRENATAL (OUTPATIENT)
Dept: OBGYN | Facility: CLINIC | Age: 40
End: 2025-04-14
Payer: MEDICAID

## 2025-04-14 ENCOUNTER — HOSPITAL ENCOUNTER (OUTPATIENT)
Dept: LAB | Facility: MEDICAL CENTER | Age: 40
End: 2025-04-14
Attending: NURSE PRACTITIONER
Payer: MEDICAID

## 2025-04-14 VITALS — WEIGHT: 137.6 LBS | SYSTOLIC BLOOD PRESSURE: 100 MMHG | DIASTOLIC BLOOD PRESSURE: 58 MMHG | BODY MASS INDEX: 27.02 KG/M2

## 2025-04-14 DIAGNOSIS — O09.523 MULTIGRAVIDA OF ADVANCED MATERNAL AGE IN THIRD TRIMESTER: ICD-10-CM

## 2025-04-14 DIAGNOSIS — Z64.1 GRAND MULTIPARITY: ICD-10-CM

## 2025-04-14 PROBLEM — O23.11: Status: RESOLVED | Noted: 2024-12-09 | Resolved: 2025-04-14

## 2025-04-14 LAB
ABO GROUP BLD: NORMAL
BLD GP AB SCN SERPL QL: NORMAL
RH BLD: NORMAL

## 2025-04-14 PROCEDURE — 87340 HEPATITIS B SURFACE AG IA: CPT

## 2025-04-14 PROCEDURE — 86850 RBC ANTIBODY SCREEN: CPT

## 2025-04-14 PROCEDURE — 86780 TREPONEMA PALLIDUM: CPT

## 2025-04-14 PROCEDURE — 86900 BLOOD TYPING SEROLOGIC ABO: CPT

## 2025-04-14 PROCEDURE — 36415 COLL VENOUS BLD VENIPUNCTURE: CPT

## 2025-04-14 PROCEDURE — 3074F SYST BP LT 130 MM HG: CPT | Performed by: NURSE PRACTITIONER

## 2025-04-14 PROCEDURE — 87389 HIV-1 AG W/HIV-1&-2 AB AG IA: CPT

## 2025-04-14 PROCEDURE — 3078F DIAST BP <80 MM HG: CPT | Performed by: NURSE PRACTITIONER

## 2025-04-14 PROCEDURE — 87086 URINE CULTURE/COLONY COUNT: CPT

## 2025-04-14 PROCEDURE — 87077 CULTURE AEROBIC IDENTIFY: CPT

## 2025-04-14 PROCEDURE — 0500F INITIAL PRENATAL CARE VISIT: CPT | Performed by: NURSE PRACTITIONER

## 2025-04-14 PROCEDURE — 86803 HEPATITIS C AB TEST: CPT

## 2025-04-14 PROCEDURE — 85027 COMPLETE CBC AUTOMATED: CPT

## 2025-04-14 PROCEDURE — 86762 RUBELLA ANTIBODY: CPT

## 2025-04-14 PROCEDURE — 86901 BLOOD TYPING SEROLOGIC RH(D): CPT

## 2025-04-14 PROCEDURE — 87591 N.GONORRHOEAE DNA AMP PROB: CPT

## 2025-04-14 PROCEDURE — 87491 CHLMYD TRACH DNA AMP PROBE: CPT

## 2025-04-14 ASSESSMENT — ENCOUNTER SYMPTOMS
NEUROLOGICAL NEGATIVE: 1
MUSCULOSKELETAL NEGATIVE: 1
EYES NEGATIVE: 1
PSYCHIATRIC NEGATIVE: 1
CONSTITUTIONAL NEGATIVE: 1
GASTROINTESTINAL NEGATIVE: 1
RESPIRATORY NEGATIVE: 1
CARDIOVASCULAR NEGATIVE: 1

## 2025-04-14 ASSESSMENT — EDINBURGH POSTNATAL DEPRESSION SCALE (EPDS)
TOTAL SCORE: 1
THINGS HAVE BEEN GETTING ON TOP OF ME: NO, I HAVE BEEN COPING AS WELL AS EVER
I HAVE FELT SCARED OR PANICKY FOR NO GOOD REASON: NO, NOT AT ALL
I HAVE BEEN ANXIOUS OR WORRIED FOR NO GOOD REASON: NO, NOT AT ALL
I HAVE FELT SAD OR MISERABLE: NOT VERY OFTEN
I HAVE BEEN ABLE TO LAUGH AND SEE THE FUNNY SIDE OF THINGS: AS MUCH AS I ALWAYS COULD
I HAVE LOOKED FORWARD WITH ENJOYMENT TO THINGS: AS MUCH AS I EVER DID
THE THOUGHT OF HARMING MYSELF HAS OCCURRED TO ME: NEVER
I HAVE BEEN SO UNHAPPY THAT I HAVE BEEN CRYING: NO, NEVER
I HAVE BLAMED MYSELF UNNECESSARILY WHEN THINGS WENT WRONG: NO, NEVER
I HAVE BEEN SO UNHAPPY THAT I HAVE HAD DIFFICULTY SLEEPING: NOT AT ALL

## 2025-04-14 NOTE — ASSESSMENT & PLAN NOTE
Orders:    PREG CNTR PRENATAL PN; Future    US-OB 2ND 3RD TRI COMPLETE; Future    GLUCOSE 1HR GESTATIONAL; Future    Chlamydia/GC, PCR (Urine); Future

## 2025-04-14 NOTE — PROGRESS NOTES
Pt. Here for NOB visit.  Pt states having + FM, kick count sheet given along with instructions.   # [phone]  LMP 9/2/2024  Last pap smear 2/3/2023 ASCUS with + HPV colposcopy done 5/3/2023 no biopsy done recommendation was to repeat colpo, pt states doesn't remember coming back for another colposcopy.   Late prenatal care   Pt. States no complaints.   Tdap offered and would like to read information.   BTL offered and would like to think about.   EPDS =1

## 2025-04-14 NOTE — PROGRESS NOTES
Subjective     S:  Isabel Galarza is a 39 y.o.  female  @ She is 32w0d with an YOLANDA of Estimated Date of Delivery: 25 based off of LMP who presents for her new OB exam.        One ER visits for Bell's palsy in this pregnancy. She reports no complaints. Reports positive FM, no VB, LOF, or cramping.  Denies dysuria, vaginal DC.  Pt is single and lives with family. She is currently not working outside of home, discussed heavy lifting,  no chemical exposure.  Pregnancy is unplanned.      OB/GYN Hx:    x 1, ectopic x1 with removal of RT tube,  x 4, SAB x2- no D&C needed for either,  followed by  x 2  History of fibroids, cervical, uterine or ovarian surgery: LT salpingectomy  Last pap smear was abnormal, colpo done with no f/u  History of HSV I or II in self or partner: no  History of Thyroid problems: no  History of STIs in self or partner: no  History of tobacco, drug or alcohol use: no  History of depression or anxiety no    O:    Vitals:    25 0842   BP: 100/58   Weight: 137 lb 9.6 oz    See H&P Prenatal Physical and Prenatal Vitals.              A:   1.  IUP @ 32w0d YOLANDA: Estimated Date of Delivery: 25 per LMP         2.  S=D        3.    Patient Active Problem List    Diagnosis Date Noted    Multigravida of advanced maternal age in third trimester 2025    ASCUS with positive high risk human papillomavirus of vagina 2023    Grand multiparity 2023         P:  1.  GC/CT ordered. Pap deferred d/t late gestation.         2.  Prenatal labs and 1 hr GTT ordered - lab slip given        3.  Discussed diet and exercise during pregnancy. Encouraged good nutrition, and daily exercise including walking or swimming. Discussed expected weight gain during pregnancy.              4.  Discussed adequate hydration during pregnancy.        5.  NOB packet given        6.  Return to office in 2 wks        7.  Complete OB US at earliest availability        8.  Pregnancy  guide provided        9.  Kick count information provided       10. Wants to think about TDAP       11. Wants to think about BTL, otherwise pills       HPI    Review of Systems   Constitutional: Negative.    HENT: Negative.     Eyes: Negative.    Respiratory: Negative.     Cardiovascular: Negative.    Gastrointestinal: Negative.    Genitourinary: Negative.    Musculoskeletal: Negative.    Skin: Negative.    Neurological: Negative.    Endo/Heme/Allergies: Negative.    Psychiatric/Behavioral: Negative.                Objective     /58   Wt 137 lb 9.6 oz   LMP 09/02/2024   BMI 27.02 kg/m²      Physical Exam  Vitals and nursing note reviewed.   Constitutional:       Appearance: She is well-developed.   Cardiovascular:      Rate and Rhythm: Normal rate and regular rhythm.      Heart sounds: Normal heart sounds.   Pulmonary:      Effort: Pulmonary effort is normal.      Breath sounds: Normal breath sounds.   Abdominal:      Palpations: Abdomen is soft.   Genitourinary:     Vagina: Normal.      Comments: Uterus enlarged, c/w 32 wk ga  Musculoskeletal:         General: Normal range of motion.      Cervical back: Normal range of motion and neck supple.   Skin:     General: Skin is warm and dry.   Neurological:      Mental Status: She is alert and oriented to person, place, and time.      Deep Tendon Reflexes: Reflexes are normal and symmetric.   Psychiatric:         Behavior: Behavior normal.         Thought Content: Thought content normal.         Judgment: Judgment normal.      Comments: EPDS = 1                                  Assessment & Plan  Grand multiparity    Orders:    PREG CNTR PRENATAL PN; Future    US-OB 2ND 3RD TRI COMPLETE; Future    GLUCOSE 1HR GESTATIONAL; Future    Chlamydia/GC, PCR (Urine); Future    Multigravida of advanced maternal age in third trimester    Orders:    PREG CNTR PRENATAL PN; Future    US-OB 2ND 3RD TRI COMPLETE; Future    GLUCOSE 1HR GESTATIONAL; Future    Chlamydia/GC, PCR  (Urine); Future

## 2025-04-14 NOTE — LETTER
"Count Your Baby's Movements  Another step to a healthy delivery    Isabel Cardonadexter Galarza  [facility]  Dept: [phone]    How Many Weeks Pregnant? 32w0d    Date to Begin Countin2025              How to use this chart    One way for your physician to keep track of your baby's health is by knowing how often the baby moves (or \"kicks\") in your womb.  You can help your physician to do this by using this chart every day.    Every day, you should see how many hours it takes for your baby to move 10 times.  Start in the morning, as soon as you get up.    First, write down the time your baby moves until you get to 10.  Check off one box every time your baby moves until you get to 10.  Write down the time you finished counting in the last column.  Total how long it took to count up all 10 movements.  Finally, fill in the box that shows how long this took.  After counting 10 movements, you no longer have to count any more that day.  The next morning, just start counting again as soon as you get up.    What should you call a \"movement\"?  It is hard to say, because it will feel different from one mother to another and from one pregnancy to the next.  The important thing is that you count the movements the same way throughout your pregnancy.  If you have more questions, you should ask your physician.    Count carefully every day!  SAMPLE:  Week 28    How many hours did it take to feel 10 movements?       Start  Time     1     2     3     4     5     6     7     8     9     10   Finish Time   Mon 8:20           11:40                  Fri               Sat               Sun                 IMPORTANT: You should contact your physician if it takes more than two hours for you to feel 10 movements.  Each morning, write down the time and start to count the movements of your baby.  Keep track by checking off one box every time you feel one movement.  When " "you have felt 10 \"kicks\", write down the time you finished counting in the last column.  Then fill in the   box (over the check robb) for the number of hours it took.  Be sure to read the complete instructions on the previous page.            "

## 2025-04-15 ENCOUNTER — RESULTS FOLLOW-UP (OUTPATIENT)
Dept: OBGYN | Facility: CLINIC | Age: 40
End: 2025-04-15

## 2025-04-15 ENCOUNTER — HOSPITAL ENCOUNTER (OUTPATIENT)
Dept: RADIOLOGY | Facility: MEDICAL CENTER | Age: 40
End: 2025-04-15
Attending: NURSE PRACTITIONER
Payer: MEDICAID

## 2025-04-15 DIAGNOSIS — O09.523 MULTIGRAVIDA OF ADVANCED MATERNAL AGE IN THIRD TRIMESTER: ICD-10-CM

## 2025-04-15 DIAGNOSIS — Z64.1 GRAND MULTIPARITY: ICD-10-CM

## 2025-04-15 LAB
C TRACH DNA SPEC QL NAA+PROBE: NEGATIVE
ERYTHROCYTE [DISTWIDTH] IN BLOOD BY AUTOMATED COUNT: 45.3 FL (ref 35.9–50)
HBV SURFACE AG SER QL: ABNORMAL
HCT VFR BLD AUTO: 36.2 % (ref 37–47)
HCV AB SER QL: ABNORMAL
HGB BLD-MCNC: 11.9 G/DL (ref 12–16)
HIV 1+2 AB+HIV1 P24 AG SERPL QL IA: NORMAL
MCH RBC QN AUTO: 30.3 PG (ref 27–33)
MCHC RBC AUTO-ENTMCNC: 32.9 G/DL (ref 32.2–35.5)
MCV RBC AUTO: 92.1 FL (ref 81.4–97.8)
N GONORRHOEA DNA SPEC QL NAA+PROBE: NEGATIVE
PLATELET # BLD AUTO: 295 K/UL (ref 164–446)
PMV BLD AUTO: 10.9 FL (ref 9–12.9)
RBC # BLD AUTO: 3.93 M/UL (ref 4.2–5.4)
RUBV AB SER QL: 77 IU/ML
SPECIMEN SOURCE: NORMAL
T PALLIDUM AB SER QL IA: ABNORMAL
WBC # BLD AUTO: 11.3 K/UL (ref 4.8–10.8)

## 2025-04-15 PROCEDURE — 76805 OB US >/= 14 WKS SNGL FETUS: CPT

## 2025-04-16 LAB
BACTERIA UR CULT: NORMAL
SIGNIFICANT IND 70042: NORMAL
SITE SITE: NORMAL
SOURCE SOURCE: NORMAL

## 2025-04-22 ENCOUNTER — RESULTS FOLLOW-UP (OUTPATIENT)
Dept: OBGYN | Facility: CLINIC | Age: 40
End: 2025-04-22

## 2025-04-22 DIAGNOSIS — O36.5930 POOR FETAL GROWTH AFFECTING MANAGEMENT OF MOTHER IN THIRD TRIMESTER, SINGLE OR UNSPECIFIED FETUS: ICD-10-CM

## 2025-04-22 NOTE — TELEPHONE ENCOUNTER
----- Message from Nurse Practitioner PATRIA Morgan sent at 4/22/2025  8:45 AM PDT -----  US: FGR. EFW: 2%, AC 18%, YAMILKA 17.4 Dopplers wnl. Need twice weekly NSTs. Dopplers and YAMILKA ordered-needs weekly    4/22/2025 1003  Prior to calling pt, consulted with Dr Rivera, who agreed with Josselin's recommendations of testing.    Called pt and informed her that everything looks normal on the US except that the baby is measuring small and because of this Josselin and Dr Rivera both recommend that she do NSTs 2x weekly and have US that measures the YAMILKA and makes sure the blood flow though the umbilical cord is good. Pt verbalized understanding and had no questions.  Attempted to transfer to [facility] Simmons, but neither were available. Informed pt that this RN would message them to call pt. Pt agreed and verbalized understanding.    Staff message sent to Arist[facility] Simmons.

## 2025-04-29 ENCOUNTER — ANCILLARY PROCEDURE (OUTPATIENT)
Dept: OBGYN | Facility: CLINIC | Age: 40
End: 2025-04-29
Attending: NURSE PRACTITIONER
Payer: MEDICAID

## 2025-04-29 ENCOUNTER — ROUTINE PRENATAL (OUTPATIENT)
Dept: OBGYN | Facility: CLINIC | Age: 40
End: 2025-04-29
Payer: MEDICAID

## 2025-04-29 VITALS
HEART RATE: 94 BPM | DIASTOLIC BLOOD PRESSURE: 60 MMHG | SYSTOLIC BLOOD PRESSURE: 100 MMHG | BODY MASS INDEX: 29.12 KG/M2 | HEIGHT: 57 IN | WEIGHT: 135 LBS

## 2025-04-29 VITALS — SYSTOLIC BLOOD PRESSURE: 100 MMHG | DIASTOLIC BLOOD PRESSURE: 60 MMHG | BODY MASS INDEX: 29.21 KG/M2 | WEIGHT: 135 LBS

## 2025-04-29 DIAGNOSIS — O36.5930 POOR FETAL GROWTH AFFECTING MANAGEMENT OF MOTHER IN THIRD TRIMESTER, SINGLE OR UNSPECIFIED FETUS: ICD-10-CM

## 2025-04-29 RX ORDER — VALACYCLOVIR HYDROCHLORIDE 1 G/1
TABLET, FILM COATED ORAL
COMMUNITY
Start: 2025-03-09

## 2025-04-29 RX ORDER — CARBOXYMETHYLCELLULOSE SODIUM 5 MG/ML
1 SOLUTION/ DROPS OPHTHALMIC
COMMUNITY
Start: 2025-03-07

## 2025-04-29 RX ORDER — PREDNISONE 10 MG/1
TABLET ORAL
COMMUNITY
Start: 2025-03-09

## 2025-04-29 NOTE — PROGRESS NOTES
Pt here today for OBFV   +FM movemnet  No VB, LOF. Uc's   Phone number /Pharmacy verified   US- today   Tdap- [place]

## 2025-04-29 NOTE — PROGRESS NOTES
S: Pt is a 40 y.o.  at 34w1d gestation here today for routine prenatal care. Pt reports fetal movement; denies cramping, vaginal bleeding, and leaking of fluid. Reports no concerns today.       O: /60   Wt 135 lb    *see prenatal flowsheet*     US: EFW 2%    A: IUP at 34w1d       S=D         Patient Active Problem List    Diagnosis Date Noted    Poor fetal growth affecting management of mother in third trimester 2025    Multigravida of advanced maternal age in third trimester 2025    ASCUS with positive high risk human papillomavirus of vagina 2023    Grand multiparity 2023       P:   -Discussed normal s/sx pregnancy appropriate for gestational age general discomforts vs warning signs that necessitate evaluation in OB triage. Reviewed fetal movements appropriate for EGA. Reinforced adequate hydration and nutrition.  -Discussed  labor precautions, how to perform fetal kick counts, and when to report to OB triage for evaluation.   -GBS at [place]  -Pt has US today. Disc pt with MFM d/t questions of IUGR versus incorrect dating. Disc with Miguel who would like to scan in 1 week to repeat scan, will continue with monitoring (twice weekly NSTs and dopplers) until decision made regarding redating versus IUGR  -RTC in 1 weeks for routine prenatal care      Merlyn Paige C.N.M.

## 2025-04-30 ENCOUNTER — APPOINTMENT (OUTPATIENT)
Dept: OBGYN | Facility: CLINIC | Age: 40
End: 2025-04-30
Payer: MEDICAID

## 2025-05-01 ENCOUNTER — APPOINTMENT (OUTPATIENT)
Dept: OBGYN | Facility: CLINIC | Age: 40
End: 2025-05-01
Payer: MEDICAID

## 2025-05-02 ENCOUNTER — HOSPITAL ENCOUNTER (OUTPATIENT)
Dept: LAB | Facility: MEDICAL CENTER | Age: 40
End: 2025-05-02
Attending: NURSE PRACTITIONER
Payer: MEDICAID

## 2025-05-02 ENCOUNTER — APPOINTMENT (OUTPATIENT)
Dept: OBGYN | Facility: CLINIC | Age: 40
End: 2025-05-02
Payer: MEDICAID

## 2025-05-02 DIAGNOSIS — O09.523 MULTIGRAVIDA OF ADVANCED MATERNAL AGE IN THIRD TRIMESTER: ICD-10-CM

## 2025-05-02 DIAGNOSIS — Z64.1 GRAND MULTIPARITY: ICD-10-CM

## 2025-05-02 DIAGNOSIS — O36.5930 POOR FETAL GROWTH AFFECTING MANAGEMENT OF MOTHER IN THIRD TRIMESTER, SINGLE OR UNSPECIFIED FETUS: ICD-10-CM

## 2025-05-02 LAB — GLUCOSE 1H P 50 G GLC PO SERPL-MCNC: 159 MG/DL (ref 70–139)

## 2025-05-02 PROCEDURE — 36415 COLL VENOUS BLD VENIPUNCTURE: CPT

## 2025-05-02 PROCEDURE — 82950 GLUCOSE TEST: CPT

## 2025-05-02 PROCEDURE — 59025 FETAL NON-STRESS TEST: CPT | Performed by: STUDENT IN AN ORGANIZED HEALTH CARE EDUCATION/TRAINING PROGRAM

## 2025-05-02 NOTE — PROGRESS NOTES
Isabelsantiago Peres Galarza   Gestational age: 34w4d     Indication: AMA, poor fetal growth     NST Interpretation: Category 1  Baseline 130, reactive, Variability  6-25 BPM, Accelerations: Yes, Decelerations: No       Irma Brown P.A.-C.

## 2025-05-05 ENCOUNTER — NON-PROVIDER VISIT (OUTPATIENT)
Dept: OBGYN | Facility: CLINIC | Age: 40
End: 2025-05-05
Payer: MEDICAID

## 2025-05-05 ENCOUNTER — RESULTS FOLLOW-UP (OUTPATIENT)
Dept: OBGYN | Facility: CLINIC | Age: 40
End: 2025-05-05

## 2025-05-05 VITALS — BODY MASS INDEX: 30.04 KG/M2 | SYSTOLIC BLOOD PRESSURE: 100 MMHG | DIASTOLIC BLOOD PRESSURE: 59 MMHG | WEIGHT: 138.8 LBS

## 2025-05-05 DIAGNOSIS — R73.09 ELEVATED GLUCOSE TOLERANCE TEST: ICD-10-CM

## 2025-05-05 PROCEDURE — 59025 FETAL NON-STRESS TEST: CPT

## 2025-05-05 NOTE — TELEPHONE ENCOUNTER
----- Message from Nurse Practitioner PATRIA Morgan sent at 5/5/2025  9:20 AM PDT -----  1 hr GTT elevated, 3 hr GTT ordered    5/5/2025 1418  Left message for pt to call back regarding lab results.     5/6/2025  Left message for pt to call back regarding lab results.     5/7/2025 1439  Left message for pt to call back regarding lab results.     1507  Pt called and notified of abnormal 1hr gtt and need to do 3hr gtt this time. Pt instructed to fast 10 hours prior to testing. Pt informed she is only allow to drink plain water during fasting time. Pt will call lab to schedule an appt. Advised to bring a snack for after the test is done. Pt notified will be staying in the labs for the 3hr. Pt agreed to do it tomorrow. Pt verbalized understanding.

## 2025-05-05 NOTE — PROGRESS NOTES
Pt here for NST- only Poor fetal growth/AMA  Pt states no complaints.   OB/FU on 5/8/2025  Growth U/S on 5/8/2025

## 2025-05-08 ENCOUNTER — ANCILLARY PROCEDURE (OUTPATIENT)
Dept: OBGYN | Facility: CLINIC | Age: 40
End: 2025-05-08
Payer: MEDICAID

## 2025-05-08 ENCOUNTER — ROUTINE PRENATAL (OUTPATIENT)
Dept: OBGYN | Facility: CLINIC | Age: 40
End: 2025-05-08
Payer: MEDICAID

## 2025-05-08 ENCOUNTER — NON-PROVIDER VISIT (OUTPATIENT)
Dept: OBGYN | Facility: CLINIC | Age: 40
End: 2025-05-08
Payer: MEDICAID

## 2025-05-08 VITALS
HEART RATE: 82 BPM | SYSTOLIC BLOOD PRESSURE: 104 MMHG | HEIGHT: 57 IN | DIASTOLIC BLOOD PRESSURE: 56 MMHG | WEIGHT: 137 LBS | BODY MASS INDEX: 29.56 KG/M2

## 2025-05-08 VITALS — SYSTOLIC BLOOD PRESSURE: 97 MMHG | BODY MASS INDEX: 29.91 KG/M2 | WEIGHT: 138.2 LBS | DIASTOLIC BLOOD PRESSURE: 57 MMHG

## 2025-05-08 DIAGNOSIS — Z64.1 GRAND MULTIPARITY: ICD-10-CM

## 2025-05-08 DIAGNOSIS — O36.5930 POOR FETAL GROWTH AFFECTING MANAGEMENT OF MOTHER IN THIRD TRIMESTER, SINGLE OR UNSPECIFIED FETUS: ICD-10-CM

## 2025-05-08 DIAGNOSIS — Z34.83 PRENATAL CARE, SUBSEQUENT PREGNANCY, THIRD TRIMESTER: ICD-10-CM

## 2025-05-08 DIAGNOSIS — O09.33 LATE PRENATAL CARE AFFECTING PREGNANCY IN THIRD TRIMESTER: ICD-10-CM

## 2025-05-08 PROBLEM — O09.30 LATE PRENATAL CARE AFFECTING PREGNANCY: Status: ACTIVE | Noted: 2025-05-08

## 2025-05-08 PROCEDURE — 0502F SUBSEQUENT PRENATAL CARE: CPT

## 2025-05-08 PROCEDURE — 90715 TDAP VACCINE 7 YRS/> IM: CPT | Mod: JZ

## 2025-05-08 PROCEDURE — 90471 IMMUNIZATION ADMIN: CPT

## 2025-05-08 PROCEDURE — 76816 OB US FOLLOW-UP PER FETUS: CPT | Performed by: OBSTETRICS & GYNECOLOGY

## 2025-05-08 PROCEDURE — 59025 FETAL NON-STRESS TEST: CPT

## 2025-05-08 NOTE — PROGRESS NOTES
Pt. Here for OB/FU.   35w3d  Reports Good FM.   Pt. Denies VB, LOF, or UC's.   BTL: signed.  TDAP:  Offered (VIS given)  Phone/Pharm verified. [phone]

## 2025-05-08 NOTE — PROGRESS NOTES
S: Pt is a 40 y.o.  at 33w1d gestation here today for routine prenatal care. Pt reports fetal movement; denies cramping, vaginal bleeding, and leaking of fluid. No concerns today. Planning to get 3hr done this week.      O: BP 97/57   Wt 138 lb 3.2 oz    *see prenatal flowsheet*     A: IUP at 35w3d       S=D         Patient Active Problem List    Diagnosis Date Noted    Prenatal care, subsequent pregnancy, third trimester 2025    Elevated glucose tolerance test 2025    Multigravida of advanced maternal age in third trimester 2025    ASCUS with positive high risk human papillomavirus of vagina 2023    Grand multiparity 2023       P:   -Discussed normal s/sx pregnancy appropriate for gestational age general discomforts vs warning signs that necessitate evaluation in OB triage. Reviewed fetal movements appropriate for EGA. Reinforced adequate hydration and nutrition.  Discussed case with Kenyatta MD today regarding incorrect dating versus IUGR. Pt redated based on earliest US at 29w6d, c/w scan today. Per MFM, pt redated today. *NO LONGER IUGR*. Disc w/ pt--okay to f/u in 2 weeks for routine PNC as well as cancel NSTs/dopplers for monitoring.   -BTL signed today  -Tdap given today  -Encouraged to get 3hr done this week  -Discussed  labor precautions, how to perform fetal kick counts, and when to report to OB triage for evaluation.   -RTC in 2 weeks for routine prenatal care      Merlyn Paige C.N.M.

## 2025-05-15 ENCOUNTER — APPOINTMENT (OUTPATIENT)
Dept: OBGYN | Facility: CLINIC | Age: 40
End: 2025-05-15
Payer: MEDICAID

## 2025-05-22 ENCOUNTER — ROUTINE PRENATAL (OUTPATIENT)
Dept: OBGYN | Facility: CLINIC | Age: 40
End: 2025-05-22
Payer: MEDICAID

## 2025-05-22 VITALS — BODY MASS INDEX: 30.08 KG/M2 | SYSTOLIC BLOOD PRESSURE: 100 MMHG | WEIGHT: 139 LBS | DIASTOLIC BLOOD PRESSURE: 50 MMHG

## 2025-05-22 DIAGNOSIS — Z34.83 PRENATAL CARE, SUBSEQUENT PREGNANCY, THIRD TRIMESTER: Primary | ICD-10-CM

## 2025-05-22 PROCEDURE — 3074F SYST BP LT 130 MM HG: CPT

## 2025-05-22 PROCEDURE — 3078F DIAST BP <80 MM HG: CPT

## 2025-05-22 PROCEDURE — 0502F SUBSEQUENT PRENATAL CARE: CPT

## 2025-05-22 NOTE — PROGRESS NOTES
S: Pt is a 40 y.o.  at 35w1d gestation here today for routine prenatal care. Pt reports fetal movement; denies cramping, vaginal bleeding, and leaking of fluid. Denies concerns today. Has not gotten 3hr GTT completed yet.       O: /50   Wt 139 lb    *see prenatal flowsheet*     A: IUP at 35w1d       S=D         Patient Active Problem List    Diagnosis Date Noted    Prenatal care, subsequent pregnancy, third trimester 2025    Late prenatal care affecting pregnancy 2025    Elevated glucose tolerance test 2025    Multigravida of advanced maternal age in third trimester 2025    ASCUS with positive high risk human papillomavirus of vagina 2023    Grand multiparity 2023       P:   -Discussed normal s/sx pregnancy appropriate for gestational age general discomforts vs warning signs that necessitate evaluation in OB triage. Reviewed fetal movements appropriate for EGA. Reinforced adequate hydration and nutrition.  -Discussed  labor precautions, how to perform fetal kick counts, and when to report to OB triage for evaluation.   -Disc 3hr glucose test, pt will go to lab in [place] [facility]  -Disc NSTs starting next week, pt considering IOL at 39 weeks  -RTC in 1 weeks for routine prenatal care      Merlyn Paige C.N.M.

## 2025-05-22 NOTE — PROGRESS NOTES
Pt here today for OBFV   +FM movemnet  No VB, LOF. Uc's   Phone number /Pharmacy verified   3hr- pt states she isn't able to get It done due to her living down in [place]. Is wondering if there are any other options

## 2025-05-30 ENCOUNTER — NON-PROVIDER VISIT (OUTPATIENT)
Dept: OBGYN | Facility: CLINIC | Age: 40
End: 2025-05-30
Payer: MEDICAID

## 2025-05-30 ENCOUNTER — HOSPITAL ENCOUNTER (OUTPATIENT)
Facility: MEDICAL CENTER | Age: 40
End: 2025-05-30
Attending: PHYSICIAN ASSISTANT
Payer: MEDICAID

## 2025-05-30 ENCOUNTER — ROUTINE PRENATAL (OUTPATIENT)
Dept: OBGYN | Facility: CLINIC | Age: 40
End: 2025-05-30
Payer: MEDICAID

## 2025-05-30 VITALS — SYSTOLIC BLOOD PRESSURE: 98 MMHG | BODY MASS INDEX: 30.38 KG/M2 | WEIGHT: 140.4 LBS | DIASTOLIC BLOOD PRESSURE: 60 MMHG

## 2025-05-30 DIAGNOSIS — O09.523 MULTIGRAVIDA OF ADVANCED MATERNAL AGE IN THIRD TRIMESTER: ICD-10-CM

## 2025-05-30 DIAGNOSIS — O09.523 MULTIGRAVIDA OF ADVANCED MATERNAL AGE IN THIRD TRIMESTER: Primary | ICD-10-CM

## 2025-05-30 PROCEDURE — 87150 DNA/RNA AMPLIFIED PROBE: CPT

## 2025-05-30 PROCEDURE — 87081 CULTURE SCREEN ONLY: CPT

## 2025-05-30 NOTE — PROGRESS NOTES
Pt has no complaints with cramping, regular UCs, Vb, LOF though pt has incr pressure. +FM. GBS collected today. Labor precautions reviewed. US wnl - no IUGR, pt aware. Cervix: 3/75/-3, mid, soft, vtx. NST today for AMA. RTC 1 wk or sooner prn. Pt prefers natural labor to IOL at this time.    NST is Ct 1 per my read for AMA. FHT baseline 130bp, +accles, no decels, mod variability. One UC noted on toco during NST.

## 2025-05-30 NOTE — PROGRESS NOTES
Pt. here for Ob f/u, NST and GBS today. Good # [phone]  Poor fetal growth, AMA  Good FM  Pt states has been feeling pelvic pressure.  Pt states has been able to do her 3 HR GTT due to transportation and lives in [place] states she is still planning on doing it.

## 2025-06-01 LAB — GP B STREP DNA SPEC QL NAA+PROBE: NEGATIVE

## 2025-06-06 ENCOUNTER — NON-PROVIDER VISIT (OUTPATIENT)
Dept: OBGYN | Facility: CLINIC | Age: 40
End: 2025-06-06
Payer: MEDICAID

## 2025-06-06 ENCOUNTER — ROUTINE PRENATAL (OUTPATIENT)
Dept: OBGYN | Facility: CLINIC | Age: 40
End: 2025-06-06
Payer: MEDICAID

## 2025-06-06 VITALS — DIASTOLIC BLOOD PRESSURE: 59 MMHG | WEIGHT: 141.2 LBS | BODY MASS INDEX: 30.56 KG/M2 | SYSTOLIC BLOOD PRESSURE: 106 MMHG

## 2025-06-06 DIAGNOSIS — R73.09 ELEVATED GLUCOSE TOLERANCE TEST: Primary | ICD-10-CM

## 2025-06-06 DIAGNOSIS — Z34.83 PRENATAL CARE, SUBSEQUENT PREGNANCY, THIRD TRIMESTER: ICD-10-CM

## 2025-06-06 PROCEDURE — 0502F SUBSEQUENT PRENATAL CARE: CPT | Performed by: NURSE PRACTITIONER

## 2025-06-06 PROCEDURE — 59025 FETAL NON-STRESS TEST: CPT | Performed by: NURSE PRACTITIONER

## 2025-06-06 PROCEDURE — 3074F SYST BP LT 130 MM HG: CPT | Performed by: NURSE PRACTITIONER

## 2025-06-06 PROCEDURE — 3078F DIAST BP <80 MM HG: CPT | Performed by: NURSE PRACTITIONER

## 2025-06-06 NOTE — PROGRESS NOTES
Pt. Here for OB/FU and NST. Reports Good FM.   AMA and poor fetal growth   Good # [phone]  Pt states no complaints.   Pt states not able to do 3 HR GTT due to transportation refusal form signed   GBS negative

## 2025-06-06 NOTE — PROGRESS NOTES
S:  Pt is  at 37w2d here for routine OB follow up.  No concerns today. No ED or hospital visits since last seen. Reports good FM.  Denies VB, LOF, RUCs, or vaginal DC.   Pt declines to have 3 hr GTT drawn, AMA signed.     O:  See above vitals        Fetal position: cephalic        GBS neg  -- reviewed w pt.      Indication:     Reactive NST per my read: baseline 125, moderate variability, accels to 160, 2 accels in 20 min, no decels. NO UA     A:  IUP at 37w2d  Patient Active Problem List    Diagnosis Date Noted    Prenatal care, subsequent pregnancy, third trimester 2025    Late prenatal care affecting pregnancy 2025    Elevated glucose tolerance test 2025    Multigravida of advanced maternal age in third trimester 2025    ASCUS with positive high risk human papillomavirus of vagina 2023    Grand multiparity 2023       P:  1.  Anticipatory guidance given         2.  Labor precautions given.  Instructions given on where to go.  Pt receptive to education.         3.  D/w pt plan for VE and IOL referral at 39 wk visit.        4.  Questions answered.         5.  Encouraged adequate water intake, walking 30-60 min daily, pelvic rocking, birthing ball       6.  F/u 1wk       7.  NST weekly for AMA.

## 2025-06-12 ENCOUNTER — NON-PROVIDER VISIT (OUTPATIENT)
Dept: OBGYN | Facility: CLINIC | Age: 40
End: 2025-06-12
Payer: MEDICAID

## 2025-06-20 ENCOUNTER — APPOINTMENT (OUTPATIENT)
Dept: OBGYN | Facility: CLINIC | Age: 40
End: 2025-06-20
Payer: MEDICAID

## 2025-06-20 ENCOUNTER — ROUTINE PRENATAL (OUTPATIENT)
Dept: OBGYN | Facility: CLINIC | Age: 40
End: 2025-06-20
Payer: MEDICAID

## 2025-06-20 VITALS — BODY MASS INDEX: 31.03 KG/M2 | WEIGHT: 143.4 LBS | SYSTOLIC BLOOD PRESSURE: 90 MMHG | DIASTOLIC BLOOD PRESSURE: 58 MMHG

## 2025-06-20 DIAGNOSIS — O09.523 MULTIGRAVIDA OF ADVANCED MATERNAL AGE IN THIRD TRIMESTER: Primary | ICD-10-CM

## 2025-06-20 PROCEDURE — 0502F SUBSEQUENT PRENATAL CARE: CPT | Performed by: PHYSICIAN ASSISTANT

## 2025-06-20 PROCEDURE — 59025 FETAL NON-STRESS TEST: CPT | Performed by: PHYSICIAN ASSISTANT

## 2025-06-20 NOTE — PROGRESS NOTES
Pt has no complaints with cramping, regular or strong UCs, Vb, LOF though pt has had incr pressure. +FM. No IOL scheduled yet but pt wants to go naturally, so will wait until next visit to schedule IOL, as pt has never needed one in past. GBS neg. NST today for AMA. Cervix: 3/60/-2, very post, med, vtx. Daily FKC and walks recommended. RTC 1 wk or sooner prn.     NST: FHT 130bpm, +accels, no decels, Cat 1 per my read, mod variability

## 2025-06-20 NOTE — PROGRESS NOTES
Pt. Here for OB/FU and NST. Reports Good FM.   Good # [phone]  Pt states has been having pelvic pressure, pt would like cervical exam.  GBS negative

## 2025-06-26 ENCOUNTER — HOSPITAL ENCOUNTER (EMERGENCY)
Facility: MEDICAL CENTER | Age: 40
End: 2025-06-26
Attending: STUDENT IN AN ORGANIZED HEALTH CARE EDUCATION/TRAINING PROGRAM | Admitting: STUDENT IN AN ORGANIZED HEALTH CARE EDUCATION/TRAINING PROGRAM
Payer: MEDICAID

## 2025-06-26 VITALS
HEART RATE: 82 BPM | TEMPERATURE: 97.9 F | WEIGHT: 144 LBS | BODY MASS INDEX: 31.07 KG/M2 | OXYGEN SATURATION: 94 % | DIASTOLIC BLOOD PRESSURE: 69 MMHG | SYSTOLIC BLOOD PRESSURE: 120 MMHG | HEIGHT: 57 IN | RESPIRATION RATE: 18 BRPM

## 2025-06-26 LAB
ERYTHROCYTE [DISTWIDTH] IN BLOOD BY AUTOMATED COUNT: 46.3 FL (ref 35.9–50)
HCT VFR BLD AUTO: 33.3 % (ref 37–47)
HGB BLD-MCNC: 11.4 G/DL (ref 12–16)
HOLDING TUBE BB 8507: NORMAL
MCH RBC QN AUTO: 28.8 PG (ref 27–33)
MCHC RBC AUTO-ENTMCNC: 34.2 G/DL (ref 32.2–35.5)
MCV RBC AUTO: 84.1 FL (ref 81.4–97.8)
PLATELET # BLD AUTO: 241 K/UL (ref 164–446)
PMV BLD AUTO: 10.5 FL (ref 9–12.9)
RBC # BLD AUTO: 3.96 M/UL (ref 4.2–5.4)
WBC # BLD AUTO: 11.8 K/UL (ref 4.8–10.8)

## 2025-06-26 PROCEDURE — 85027 COMPLETE CBC AUTOMATED: CPT

## 2025-06-26 PROCEDURE — 36415 COLL VENOUS BLD VENIPUNCTURE: CPT

## 2025-06-26 PROCEDURE — 99282 EMERGENCY DEPT VISIT SF MDM: CPT

## 2025-06-26 PROCEDURE — 99999 PR NO CHARGE: CPT | Performed by: STUDENT IN AN ORGANIZED HEALTH CARE EDUCATION/TRAINING PROGRAM

## 2025-06-26 PROCEDURE — 59025 FETAL NON-STRESS TEST: CPT

## 2025-06-26 NOTE — ED PROVIDER NOTES
LABOR & DELIVERY TRIAGE HISTORY AND PHYSICAL  Patient Name: Isabel Galarza Age/Sex: 40 y.o. female  : 1985 MRN: [id number]      HISTORY OF PRESENT ILLNESS:   Isabel Galarza is a 40 y.o.  at 40w1d weeks gestation by LMP who is here for vaginal spotting this morning.     Denies LOF. Positive normal FM. This morning noticed blood smear/pink-tinged spotting on wiping, no pain. Start of contractions every ~ 30 minutes this morning, but not too painful. At clinic last week , exam was 3cm. Otherwise feels well  Patient states last episode of intercourse was about 1 week ago    Her EDC is 2025, by Ultrasound.   She has [facility] with [facility].    Complications during pregnancy:   -Elevated 1h GTT, ordered but not completed 3h GTT  -Late to Prenatal Care  -Grand Multiparity  -ASCUS w HPV+ ()    History of STD: none     OBSTETRICAL HISTORY:    OB History    Para Term  AB Living   11 7 7  3 7   SAB IAB Ectopic Molar Multiple Live Births   2  1  0 7      # Outcome Date GA Lbr Julian/2nd Weight Sex Type Anes PTL Lv   11 Current            10 Term 23 39w1d  3.085 kg (6 lb 12.8 oz) F Vag-Spont EPI N DARIUS   9 Term 17 39w4d  3.17 kg (6 lb 15.8 oz) F Vag-Spont EPI  DARIUS      Birth Comments: Pt states no complications   8 2014     SAB         Birth Comments: Baby passed on its own.   7 2013 6w0d    SAB         Birth Comments: Baby passed on  it own   6 Term 11/23/10 40w0d   M Vag-Spont EPI N DARIUS      Birth Comments: Pt states no complications.   5 Term 08/10/09 39w1d 08:00 3.26 kg (7 lb 3 oz) F Vag-Vacuum None N DARIUS      Birth Comments: Pt states no complications.   4 Term 10/23/07 40w0d 08:00  M Vag-Spont None N DARIUS      Birth Comments: Pt states no complications.   3 Term 06 38w2d 10:00  F Vag-Spont None N DARIUS      Birth Comments: Pt states no complications   2 Ectopic 05 5w0d             Birth Comments: Pt had right tube removed per note   1 Term  "04/10/02 40w0d 15:00  F Vag-Spont None N DARIUS      Birth Comments: Pt states no complications       MEDICAL HISTORY:    Past Medical History[1]    SURGICAL HISTORY:    Past Surgical History[2]    MEDICATIONS:    Prescriptions Prior to Admission[3]    ALLERGIES:    Allergies[4]     SOCIAL HISTORY:      reports that she has never smoked. She has never used smokeless tobacco. She reports that she does not currently use alcohol. She reports that she does not currently use drugs after having used the following drugs: Marijuana.    FAMILY HISTORY:    Family History   Problem Relation Age of Onset    Other Mother         pt states doesn't know her birth mom    No Known Problems Father     No Known Problems Brother     Other Problem (blood pressure) Paternal Grandmother        REVIEW OF SYSTEMS:  Pertinent items are noted in HPI.      PHYSICAL EXAM:    Vitals:    25 1026 25 1027   BP: 120/69    Pulse: 82    Resp:  18   Temp:  36.6 °C (97.9 °F)   TempSrc:  Temporal   SpO2: 94%    Weight: 65.3 kg (144 lb)    Height: 1.448 m (4' 9\")      Temp (24hrs), Av.6 °C (97.9 °F), Min:36.6 °C (97.9 °F), Max:36.6 °C (97.9 °F)    General: No acute distress, resting comfortably in bed.  HEENT: normocephalic, nontraumatic, PERRLA, EOMI  Cardiovascular: Heart RRR with no murmurs, rubs or gallops. Distal Pulses 2+  Respiratory: symmetric chest expansion, lungs CTAB, with no wheezes, rales, rhonci  Abdomen: gravid, nontender  Musculoskeletal: ROBERTS spontaneously  Neuro: non focal with no numbness, tingling or changes in sensation    Cervix:  4-5cm/60%/-2 @ 1030  [place]: Uterine Contractions infrequently  FHRM: Baseline 130, moderate variability, + accels, no decels    Prenatal Labs:  HepBSAg NR  HIV NR  RPR NR  Rubella immune  ABO : O+    Group B Strep: negative      ASSESSMENT/ PLAN:   Isabel Galarza is a 40 y.o.  at 40w1d weeks gestation by LMP who is here for vaginal spotting this morning.     Vaginal spotting and " contractions q30 min startgin this morning. Small cervical change from 3cm on 6/20 to 4-5cm today on initial check. Reassuring reactive Cat 1 FHT. No LOF and normal FM. On recheck SVE, cervix unchanged on recheck 1-2 with no change in strength or frequency of mild contractions every ~30 min.  Patient bloodless and last CBC April. Recheck H/H today 11.4 for updated baseline.  Patient requesting to continue early labor waiting at home until contractions become more frequent.       - Patient is cleared to return home with family. Encouraged to see MD/DO for increased painful uterine contractions @ 3-5, vaginal bleeding, loss of fluid, or other serious symptoms. Return Precautions given if she feels unable to make it to [place] for delivery, to go to [place] where she lives       Discussed case with Dr. Cotton, [facility] Attending. Case was discussed and attending agreed with plan prior to discharge of patient.    No follow-up provider specified.     No future appointments.        Surya Gallardo M.D.  PGY-1 Resident   [facility]          [1]   Past Medical History:  Diagnosis Date    Allergy     Bell's palsy affecting pregnancy in third trimester 03/07/2025    Urinary tract infection    [2]   Past Surgical History:  Procedure Laterality Date    TUBE & ECTOPIC PREG., REMOVAL Left 01/01/2006    GYN SURGERY     [3]   Medications Prior to Admission   Medication Sig Dispense Refill Last Dose/Taking    valacyclovir (VALTREX) 1 GM Tab TAKE 1 TABLET (1 GRAM) BY MOUTH 2 TIMES A DAY FOR 7 DAYS. (Patient not taking: Reported on 6/6/2025)       predniSONE (DELTASONE) 10 MG Tab PLEASE SEE ATTACHED FOR DETAILED DIRECTIONS (Patient not taking: Reported on 6/6/2025)       Carboxymethylcellulose Sod PF 0.5 % Solution Administer 1 Drop into affected eye(s). (Patient not taking: Reported on 6/6/2025)       ondansetron (ZOFRAN ODT) 4 MG TABLET DISPERSIBLE Take 1 Tablet by mouth every 6 hours as needed for Nausea/Vomiting for up to 15  "doses. (Patient not taking: Reported on 4/14/2025) 15 Tablet 0     ibuprofen (MOTRIN) 800 MG Tab Take 1 Tablet by mouth every 8 hours as needed for Moderate Pain. (Patient not taking: Reported on 6/6/2025) 30 Tablet 0     acetaminophen (TYLENOL) 500 MG Tab Take 2 Tablets by mouth every 6 hours as needed for Mild Pain. (Patient not taking: Reported on 6/6/2025) 30 Tablet 0     Prenatal MV-Min-Fe Fum-FA-DHA (PRENATAL 1 PO) Take  by mouth.      [4]   Allergies  Allergen Reactions    Bloodless      Will not accept any kind of blood product that is not her own even in the case of emergency    Kiwi Extract Swelling    Pineapple Swelling    Sulfa Drugs Hives     \"when I was in custodial\"  \"I looked like a lobster\"     "

## 2025-06-26 NOTE — PROGRESS NOTES
"   EDC -     Pt presents to L&D with c/o \"spotting.\" Pt states she has been noticing some pink blood throughout the day today and is experiencing contractions approximately every 30 minutes. Pt reports good fetal movement, denies leaking of fluid. EFM/TOCO applied, SVE by RN- 4-5/70/-2. Scant amount of brown blood present on exam glove.   "

## 2025-06-26 NOTE — PROGRESS NOTES
1230- SVE repeated, no change.     1235- discharge order received.    1254- discharge teaching done, pt verbalized understanding.

## 2025-06-27 ENCOUNTER — ANESTHESIA (OUTPATIENT)
Dept: ANESTHESIOLOGY | Facility: MEDICAL CENTER | Age: 40
End: 2025-06-27
Payer: MEDICAID

## 2025-06-27 ENCOUNTER — ANESTHESIA EVENT (OUTPATIENT)
Dept: ANESTHESIOLOGY | Facility: MEDICAL CENTER | Age: 40
End: 2025-06-27
Payer: MEDICAID

## 2025-06-27 ENCOUNTER — HOSPITAL ENCOUNTER (INPATIENT)
Facility: MEDICAL CENTER | Age: 40
LOS: 2 days | End: 2025-06-29
Attending: STUDENT IN AN ORGANIZED HEALTH CARE EDUCATION/TRAINING PROGRAM | Admitting: STUDENT IN AN ORGANIZED HEALTH CARE EDUCATION/TRAINING PROGRAM
Payer: MEDICAID

## 2025-06-27 LAB
BASOPHILS # BLD AUTO: 0.3 % (ref 0–1.8)
BASOPHILS # BLD: 0.05 K/UL (ref 0–0.12)
EOSINOPHIL # BLD AUTO: 0.1 K/UL (ref 0–0.51)
EOSINOPHIL NFR BLD: 0.7 % (ref 0–6.9)
ERYTHROCYTE [DISTWIDTH] IN BLOOD BY AUTOMATED COUNT: 46.5 FL (ref 35.9–50)
HCT VFR BLD AUTO: 35.7 % (ref 37–47)
HGB BLD-MCNC: 11.8 G/DL (ref 12–16)
IMM GRANULOCYTES # BLD AUTO: 0.07 K/UL (ref 0–0.11)
IMM GRANULOCYTES NFR BLD AUTO: 0.5 % (ref 0–0.9)
LYMPHOCYTES # BLD AUTO: 2.35 K/UL (ref 1–4.8)
LYMPHOCYTES NFR BLD: 16.3 % (ref 22–41)
MCH RBC QN AUTO: 28.2 PG (ref 27–33)
MCHC RBC AUTO-ENTMCNC: 33.1 G/DL (ref 32.2–35.5)
MCV RBC AUTO: 85.2 FL (ref 81.4–97.8)
MONOCYTES # BLD AUTO: 0.62 K/UL (ref 0–0.85)
MONOCYTES NFR BLD AUTO: 4.3 % (ref 0–13.4)
NEUTROPHILS # BLD AUTO: 11.21 K/UL (ref 1.82–7.42)
NEUTROPHILS NFR BLD: 77.9 % (ref 44–72)
NRBC # BLD AUTO: 0 K/UL
NRBC BLD-RTO: 0 /100 WBC (ref 0–0.2)
PLATELET # BLD AUTO: 250 K/UL (ref 164–446)
PMV BLD AUTO: 10.7 FL (ref 9–12.9)
RBC # BLD AUTO: 4.19 M/UL (ref 4.2–5.4)
T PALLIDUM AB SER QL IA: NORMAL
WBC # BLD AUTO: 14.4 K/UL (ref 4.8–10.8)

## 2025-06-27 PROCEDURE — 700105 HCHG RX REV CODE 258

## 2025-06-27 PROCEDURE — 700102 HCHG RX REV CODE 250 W/ 637 OVERRIDE(OP)

## 2025-06-27 PROCEDURE — 303615 HCHG EPIDURAL/SPINAL ANESTHESIA FOR LABOR

## 2025-06-27 PROCEDURE — 700111 HCHG RX REV CODE 636 W/ 250 OVERRIDE (IP): Mod: JZ | Performed by: STUDENT IN AN ORGANIZED HEALTH CARE EDUCATION/TRAINING PROGRAM

## 2025-06-27 PROCEDURE — 700101 HCHG RX REV CODE 250

## 2025-06-27 PROCEDURE — 59409 OBSTETRICAL CARE: CPT

## 2025-06-27 PROCEDURE — 99282 EMERGENCY DEPT VISIT SF MDM: CPT

## 2025-06-27 PROCEDURE — 85025 COMPLETE CBC W/AUTO DIFF WBC: CPT

## 2025-06-27 PROCEDURE — 700111 HCHG RX REV CODE 636 W/ 250 OVERRIDE (IP)

## 2025-06-27 PROCEDURE — 700111 HCHG RX REV CODE 636 W/ 250 OVERRIDE (IP): Performed by: STUDENT IN AN ORGANIZED HEALTH CARE EDUCATION/TRAINING PROGRAM

## 2025-06-27 PROCEDURE — A9270 NON-COVERED ITEM OR SERVICE: HCPCS

## 2025-06-27 PROCEDURE — 86780 TREPONEMA PALLIDUM: CPT

## 2025-06-27 PROCEDURE — 304965 HCHG RECOVERY SERVICES

## 2025-06-27 PROCEDURE — 36415 COLL VENOUS BLD VENIPUNCTURE: CPT

## 2025-06-27 PROCEDURE — 59400 OBSTETRICAL CARE: CPT | Performed by: OBSTETRICS & GYNECOLOGY

## 2025-06-27 PROCEDURE — 770002 HCHG ROOM/CARE - OB PRIVATE (112)

## 2025-06-27 PROCEDURE — 700101 HCHG RX REV CODE 250: Performed by: STUDENT IN AN ORGANIZED HEALTH CARE EDUCATION/TRAINING PROGRAM

## 2025-06-27 RX ORDER — OXYTOCIN 10 [USP'U]/ML
10 INJECTION, SOLUTION INTRAMUSCULAR; INTRAVENOUS
Status: DISCONTINUED | OUTPATIENT
Start: 2025-06-27 | End: 2025-06-27 | Stop reason: HOSPADM

## 2025-06-27 RX ORDER — SIMETHICONE 125 MG
125 TABLET,CHEWABLE ORAL 4 TIMES DAILY PRN
Status: DISCONTINUED | OUTPATIENT
Start: 2025-06-27 | End: 2025-06-29 | Stop reason: HOSPADM

## 2025-06-27 RX ORDER — EPHEDRINE SULFATE 50 MG/ML
5 INJECTION, SOLUTION INTRAVENOUS
Status: DISCONTINUED | OUTPATIENT
Start: 2025-06-27 | End: 2025-06-27 | Stop reason: HOSPADM

## 2025-06-27 RX ORDER — SODIUM CHLORIDE, SODIUM LACTATE, POTASSIUM CHLORIDE, AND CALCIUM CHLORIDE .6; .31; .03; .02 G/100ML; G/100ML; G/100ML; G/100ML
250 INJECTION, SOLUTION INTRAVENOUS PRN
Status: DISCONTINUED | OUTPATIENT
Start: 2025-06-27 | End: 2025-06-27 | Stop reason: HOSPADM

## 2025-06-27 RX ORDER — AMPICILLIN INJECTION 2 G/2G
POWDER, FOR SOLUTION INTRAMUSCULAR; INTRAVENOUS
Status: ACTIVE
Start: 2025-06-27 | End: 2025-06-28

## 2025-06-27 RX ORDER — MISOPROSTOL 200 UG/1
600 TABLET ORAL
Status: DISCONTINUED | OUTPATIENT
Start: 2025-06-27 | End: 2025-06-29 | Stop reason: HOSPADM

## 2025-06-27 RX ORDER — SODIUM CHLORIDE 9 MG/ML
INJECTION, SOLUTION INTRAVENOUS CONTINUOUS
Status: DISCONTINUED | OUTPATIENT
Start: 2025-06-27 | End: 2025-06-29 | Stop reason: HOSPADM

## 2025-06-27 RX ORDER — TRANEXAMIC ACID 100 MG/ML
1000 INJECTION, SOLUTION INTRAVENOUS ONCE
Status: DISCONTINUED | OUTPATIENT
Start: 2025-06-27 | End: 2025-06-27

## 2025-06-27 RX ORDER — METHYLERGONOVINE MALEATE 0.2 MG/ML
0.2 INJECTION INTRAVENOUS
Status: DISCONTINUED | OUTPATIENT
Start: 2025-06-27 | End: 2025-06-29 | Stop reason: HOSPADM

## 2025-06-27 RX ORDER — LIDOCAINE HYDROCHLORIDE 10 MG/ML
INJECTION, SOLUTION EPIDURAL; INFILTRATION; INTRACAUDAL; PERINEURAL PRN
Status: DISCONTINUED | OUTPATIENT
Start: 2025-06-27 | End: 2025-06-27 | Stop reason: SURG

## 2025-06-27 RX ORDER — SODIUM CHLORIDE, SODIUM LACTATE, POTASSIUM CHLORIDE, AND CALCIUM CHLORIDE .6; .31; .03; .02 G/100ML; G/100ML; G/100ML; G/100ML
1000 INJECTION, SOLUTION INTRAVENOUS
Status: DISCONTINUED | OUTPATIENT
Start: 2025-06-27 | End: 2025-06-27 | Stop reason: HOSPADM

## 2025-06-27 RX ORDER — ACETAMINOPHEN 500 MG
1000 TABLET ORAL EVERY 6 HOURS PRN
Status: DISCONTINUED | OUTPATIENT
Start: 2025-06-27 | End: 2025-06-29 | Stop reason: HOSPADM

## 2025-06-27 RX ORDER — LIDOCAINE HYDROCHLORIDE AND EPINEPHRINE 15; 5 MG/ML; UG/ML
INJECTION, SOLUTION EPIDURAL
Status: COMPLETED | OUTPATIENT
Start: 2025-06-27 | End: 2025-06-27

## 2025-06-27 RX ORDER — IBUPROFEN 800 MG/1
800 TABLET, FILM COATED ORAL
Status: COMPLETED | OUTPATIENT
Start: 2025-06-27 | End: 2025-06-27

## 2025-06-27 RX ORDER — TERBUTALINE SULFATE 1 MG/ML
0.25 INJECTION SUBCUTANEOUS
Status: DISCONTINUED | OUTPATIENT
Start: 2025-06-27 | End: 2025-06-27 | Stop reason: HOSPADM

## 2025-06-27 RX ORDER — TRANEXAMIC ACID 100 MG/ML
1000 INJECTION, SOLUTION INTRAVENOUS
Status: COMPLETED | OUTPATIENT
Start: 2025-06-27 | End: 2025-06-27

## 2025-06-27 RX ORDER — MISOPROSTOL 200 UG/1
800 TABLET ORAL
Status: DISCONTINUED | OUTPATIENT
Start: 2025-06-27 | End: 2025-06-29 | Stop reason: HOSPADM

## 2025-06-27 RX ORDER — SODIUM CHLORIDE, SODIUM LACTATE, POTASSIUM CHLORIDE, CALCIUM CHLORIDE 600; 310; 30; 20 MG/100ML; MG/100ML; MG/100ML; MG/100ML
2000 INJECTION, SOLUTION INTRAVENOUS PRN
Status: DISCONTINUED | OUTPATIENT
Start: 2025-06-27 | End: 2025-06-29 | Stop reason: HOSPADM

## 2025-06-27 RX ORDER — MISOPROSTOL 200 UG/1
800 TABLET ORAL ONCE
Status: DISCONTINUED | OUTPATIENT
Start: 2025-06-27 | End: 2025-06-27

## 2025-06-27 RX ORDER — ACETAMINOPHEN 500 MG
1000 TABLET ORAL
Status: COMPLETED | OUTPATIENT
Start: 2025-06-27 | End: 2025-06-27

## 2025-06-27 RX ORDER — CARBOPROST TROMETHAMINE 250 UG/ML
250 INJECTION, SOLUTION INTRAMUSCULAR
Status: DISCONTINUED | OUTPATIENT
Start: 2025-06-27 | End: 2025-06-29 | Stop reason: HOSPADM

## 2025-06-27 RX ORDER — ROPIVACAINE HYDROCHLORIDE 2 MG/ML
INJECTION, SOLUTION EPIDURAL; INFILTRATION; PERINEURAL CONTINUOUS
Status: DISCONTINUED | OUTPATIENT
Start: 2025-06-27 | End: 2025-06-27 | Stop reason: HOSPADM

## 2025-06-27 RX ORDER — IBUPROFEN 800 MG/1
800 TABLET, FILM COATED ORAL EVERY 8 HOURS PRN
Status: DISCONTINUED | OUTPATIENT
Start: 2025-06-28 | End: 2025-06-29 | Stop reason: HOSPADM

## 2025-06-27 RX ORDER — SODIUM CHLORIDE 9 MG/ML
INJECTION, SOLUTION INTRAVENOUS
Status: ACTIVE
Start: 2025-06-27 | End: 2025-06-27

## 2025-06-27 RX ORDER — SODIUM CHLORIDE, SODIUM LACTATE, POTASSIUM CHLORIDE, CALCIUM CHLORIDE 600; 310; 30; 20 MG/100ML; MG/100ML; MG/100ML; MG/100ML
INJECTION, SOLUTION INTRAVENOUS CONTINUOUS
Status: DISCONTINUED | OUTPATIENT
Start: 2025-06-27 | End: 2025-06-27 | Stop reason: HOSPADM

## 2025-06-27 RX ORDER — DOCUSATE SODIUM 100 MG/1
100 CAPSULE, LIQUID FILLED ORAL 2 TIMES DAILY PRN
Status: DISCONTINUED | OUTPATIENT
Start: 2025-06-27 | End: 2025-06-29 | Stop reason: HOSPADM

## 2025-06-27 RX ORDER — LIDOCAINE HYDROCHLORIDE 10 MG/ML
20 INJECTION, SOLUTION INFILTRATION; PERINEURAL
Status: DISCONTINUED | OUTPATIENT
Start: 2025-06-27 | End: 2025-06-27 | Stop reason: HOSPADM

## 2025-06-27 RX ORDER — DIPHENHYDRAMINE HCL 25 MG
25 TABLET ORAL ONCE
Status: COMPLETED | OUTPATIENT
Start: 2025-06-27 | End: 2025-06-27

## 2025-06-27 RX ORDER — SODIUM CHLORIDE 9 MG/ML
INJECTION, SOLUTION INTRAVENOUS
Status: ACTIVE
Start: 2025-06-27 | End: 2025-06-28

## 2025-06-27 RX ADMIN — OXYTOCIN 2 MILLI-UNITS/MIN: 10 INJECTION, SOLUTION INTRAMUSCULAR; INTRAVENOUS at 09:40

## 2025-06-27 RX ADMIN — IBUPROFEN 800 MG: 800 TABLET, FILM COATED ORAL at 17:03

## 2025-06-27 RX ADMIN — GENTAMICIN SULFATE 248 MG: 40 INJECTION, SOLUTION INTRAMUSCULAR; INTRAVENOUS at 16:23

## 2025-06-27 RX ADMIN — TRANEXAMIC ACID 1000 MG: 100 INJECTION INTRAVENOUS at 16:04

## 2025-06-27 RX ADMIN — SODIUM CHLORIDE, POTASSIUM CHLORIDE, SODIUM LACTATE AND CALCIUM CHLORIDE: 600; 310; 30; 20 INJECTION, SOLUTION INTRAVENOUS at 08:30

## 2025-06-27 RX ADMIN — ACETAMINOPHEN 1000 MG: 500 TABLET ORAL at 15:20

## 2025-06-27 RX ADMIN — OXYTOCIN 20 UNITS: 10 INJECTION, SOLUTION INTRAMUSCULAR; INTRAVENOUS at 16:02

## 2025-06-27 RX ADMIN — OXYTOCIN 125 ML/HR: 10 INJECTION, SOLUTION INTRAMUSCULAR; INTRAVENOUS at 16:43

## 2025-06-27 RX ADMIN — LIDOCAINE HYDROCHLORIDE,EPINEPHRINE BITARTRATE 3 ML: 15; .005 INJECTION, SOLUTION EPIDURAL; INFILTRATION; INTRACAUDAL; PERINEURAL at 09:14

## 2025-06-27 RX ADMIN — DIPHENHYDRAMINE HYDROCHLORIDE 25 MG: 25 TABLET ORAL at 14:13

## 2025-06-27 RX ADMIN — AMPICILLIN SODIUM 2000 MG: 2 INJECTION, POWDER, FOR SOLUTION INTRAVENOUS at 15:24

## 2025-06-27 RX ADMIN — ROPIVACAINE HYDROCHLORIDE: 2 INJECTION, SOLUTION EPIDURAL; INFILTRATION; PERINEURAL at 09:26

## 2025-06-27 RX ADMIN — LIDOCAINE HYDROCHLORIDE 100 MG: 10 INJECTION, SOLUTION EPIDURAL; INFILTRATION; INTRACAUDAL; PERINEURAL at 13:24

## 2025-06-27 SDOH — ECONOMIC STABILITY: TRANSPORTATION INSECURITY
IN THE PAST 12 MONTHS, HAS LACK OF RELIABLE TRANSPORTATION KEPT YOU FROM MEDICAL APPOINTMENTS, MEETINGS, WORK OR FROM GETTING THINGS NEEDED FOR DAILY LIVING?: PATIENT UNABLE TO ANSWER

## 2025-06-27 SDOH — ECONOMIC STABILITY: TRANSPORTATION INSECURITY
IN THE PAST 12 MONTHS, HAS THE LACK OF TRANSPORTATION KEPT YOU FROM MEDICAL APPOINTMENTS OR FROM GETTING MEDICATIONS?: PATIENT UNABLE TO ANSWER

## 2025-06-27 ASSESSMENT — LIFESTYLE VARIABLES
HAVE PEOPLE ANNOYED YOU BY CRITICIZING YOUR DRINKING: NO
EVER HAD A DRINK FIRST THING IN THE MORNING TO STEADY YOUR NERVES TO GET RID OF A HANGOVER: NO
TOTAL SCORE: 0
ALCOHOL_USE: NO
CONSUMPTION TOTAL: INCOMPLETE
DOES PATIENT WANT TO STOP DRINKING: NO
TOTAL SCORE: 0
TOTAL SCORE: 0
HAVE YOU EVER FELT YOU SHOULD CUT DOWN ON YOUR DRINKING: NO
EVER FELT BAD OR GUILTY ABOUT YOUR DRINKING: NO

## 2025-06-27 ASSESSMENT — SOCIAL DETERMINANTS OF HEALTH (SDOH)
WITHIN THE LAST YEAR, HAVE YOU BEEN KICKED, HIT, SLAPPED, OR OTHERWISE PHYSICALLY HURT BY YOUR PARTNER OR EX-PARTNER?: PATIENT UNABLE TO ANSWER
WITHIN THE LAST YEAR, HAVE YOU BEEN AFRAID OF YOUR PARTNER OR EX-PARTNER?: PATIENT UNABLE TO ANSWER
WITHIN THE PAST 12 MONTHS, YOU WORRIED THAT YOUR FOOD WOULD RUN OUT BEFORE YOU GOT THE MONEY TO BUY MORE: PATIENT UNABLE TO ANSWER
WITHIN THE LAST YEAR, HAVE YOU BEEN HUMILIATED OR EMOTIONALLY ABUSED IN OTHER WAYS BY YOUR PARTNER OR EX-PARTNER?: PATIENT UNABLE TO ANSWER
WITHIN THE PAST 12 MONTHS, THE FOOD YOU BOUGHT JUST DIDN'T LAST AND YOU DIDN'T HAVE MONEY TO GET MORE: PATIENT UNABLE TO ANSWER
WITHIN THE LAST YEAR, HAVE TO BEEN RAPED OR FORCED TO HAVE ANY KIND OF SEXUAL ACTIVITY BY YOUR PARTNER OR EX-PARTNER?: PATIENT UNABLE TO ANSWER
IN THE PAST 12 MONTHS, HAS THE ELECTRIC, GAS, OIL, OR WATER COMPANY THREATENED TO SHUT OFF SERVICE IN YOUR HOME?: PATIENT UNABLE TO ANSWER

## 2025-06-27 ASSESSMENT — PAIN DESCRIPTION - PAIN TYPE
TYPE: ACUTE PAIN
TYPE: ACUTE PAIN

## 2025-06-27 ASSESSMENT — PATIENT HEALTH QUESTIONNAIRE - PHQ9
2. FEELING DOWN, DEPRESSED, IRRITABLE, OR HOPELESS: NOT AT ALL
1. LITTLE INTEREST OR PLEASURE IN DOING THINGS: NOT AT ALL
SUM OF ALL RESPONSES TO PHQ9 QUESTIONS 1 AND 2: 0

## 2025-06-27 NOTE — PROGRESS NOTES
"0650: Report from KEYANA Buenrostro.    0843: Physician contacted, bloodless consent signed, advanced directive, social consult placed, \"BLOODLESS\"  entered into allergy section, patient has \"No Blood\" armband and stickers applied to chart.     0904: MD Rodrigo to bedside for epidural placement.     0919: Negative test dose appreciated.     0940: Oxytocin started.    1000: SVE: 6-7/80/-1.    1013: Pt called out to nursing station. Pt states she feels wet like her water broke. Pad underneath pt saturated. MD Jose R notified of pt membrane status.    1300: MD Surya to bedside. SVE: ant lip. Test push, cervix still 9.5cm. Will continue to labor.    1405: Pt feeling increased pressure. SVE. Swollen ant lip. MD Sami notified. Orders received for benadryl.    1454: MD Sami notified of elevated temp    1552: MD Jose R and MD Sami to bedside. SVE: complete.    1600:  of viable female infant.    0652: Bedside report to AIDAN Yan RN.  "

## 2025-06-27 NOTE — ED PROVIDER NOTES
"S: Pt is a 40 y.o.  at 40w2d with Estimated Date of Delivery: 25 who presents to triage c/o contractions that are increasing in intensity. She was seen yesterday for contractions though was discharged home after minimal cervical change. She reports good FM, no LOF or VB.      O: /62   Pulse 76   Temp 35.9 °C (96.6 °F) (Temporal)   Resp 18   Ht 1.448 m (4' 9\")   Wt 65.3 kg (144 lb)   LMP 2024   SpO2 96%   BMI 31.16 kg/m²     NST:       FHR baseline: 125       Variability: moderate       Acclerations: none       Decelerations: none  [place]: Ucs q10 minutes, strong to palpation  SVE: 4-5cm per RN      A/P  Patient Active Problem List    Diagnosis Date Noted    Prenatal care, subsequent pregnancy, third trimester 2025    Late prenatal care affecting pregnancy 2025    Elevated 1hr  --> 3hr GTT never completed 2025    Multigravida of advanced maternal age in third trimester 2025    ASCUS with positive high risk human papillomavirus of vagina 2023    Grand multiparity 2023       #40 y.o.  @ 40w2d    #AMA    #Fetal well-being  -NST with moderate variability though no accelerations, will admit with CEFM    #Contractions  -Cervical exam the same from yesterday, though considering regularity of contractions and being 40w2d, grandmultiparity, AMA with a favorable cervix, offered to admit for labor. Pt would like an epidural for pain management. She is okay with augmentation if her contractions space out. See H&P.      Merlyn Paige CNM, DEION Cotton MD is the Attending      "

## 2025-06-27 NOTE — ANESTHESIA PREPROCEDURE EVALUATION
Date: 06/27/25  Procedure: Labor Epidural         Relevant Problems   No relevant active problems       Physical Exam    Airway   Mallampati: II  TM distance: >3 FB  Neck ROM: full       Cardiovascular - normal exam  Rhythm: regular  Rate: normal    (-) murmur     Dental - normal exam           Pulmonary - normal examBreath sounds clear to auscultation     Abdominal    Neurological - normal exam                   Anesthesia Plan    ASA 2       Plan - epidural   Neuraxial block will be labor analgesia                  Pertinent diagnostic labs and testing reviewed    Informed Consent:    Anesthetic plan and risks discussed with patient.    Use of blood products discussed with: whom did not consent to blood products.

## 2025-06-27 NOTE — H&P
Admission History and Physical      Isabel Galarza is a 40 y.o. female  at 40w2d who presents for strong contractions    Subjective:   reports contractions since yesterday though increasing in intensity since this morning  reports pain  denies leaking of fluid  denies vaginal bleeding.   reports fetal movement    She reports feeling over all well. This pregnancy is complicated by AMA, grandmultiparity, elevated 1hr with no 3hr glucose testing, and late entry to prenatal care. Her pregnancy dating is based on a third trimester ultrasound.  She would like an epidural for pain management. Denies issues with her previous births. She did have a VAVD for her fourth child.     She is bloodless.     ROS: Pertinent items are noted in HPI.    Past Medical History[1]  Past Surgical History[2]  OB History    Para Term  AB Living   11 7 7  3 7   SAB IAB Ectopic Molar Multiple Live Births   2  1  0 7      # Outcome Date GA Lbr Julian/2nd Weight Sex Type Anes PTL Lv   11 Current            10 Term 23 39w1d  3.085 kg (6 lb 12.8 oz) F Vag-Spont EPI N DARIUS   9 Term 17 39w4d  3.17 kg (6 lb 15.8 oz) F Vag-Spont EPI  DARIUS      Birth Comments: Pt states no complications   8 2014     SAB         Birth Comments: Baby passed on its own.   7 2013 6w0d    SAB         Birth Comments: Baby passed on  it own   6 Term 11/23/10 40w0d   M Vag-Spont EPI N DARIUS      Birth Comments: Pt states no complications.   5 Term 08/10/09 39w1d 08:00 3.26 kg (7 lb 3 oz) F Vag-Vacuum None N DARIUS      Birth Comments: Pt states no complications.   4 Term 10/23/07 40w0d 08:00  M Vag-Spont None N DARIUS      Birth Comments: Pt states no complications.   3 Term 06 38w2d 10:00  F Vag-Spont None N DARIUS      Birth Comments: Pt states no complications   2 Ectopic 05 5w0d             Birth Comments: Pt had right tube removed per note   1 Term 04/10/02 40w0d 15:00  F Vag-Spont None N DARIUS      Birth Comments: Pt states no  "complications     Social History     Socioeconomic History    Marital status: Single     Spouse name: Not on file    Number of children: Not on file    Years of education: Not on file    Highest education level: Not on file   Occupational History    Not on file   Tobacco Use    Smoking status: Never    Smokeless tobacco: Never   Vaping Use    Vaping status: Former   Substance and Sexual Activity    Alcohol use: Not Currently     Comment: socially    Drug use: Not Currently     Types: Marijuana     Comment: occ    Sexual activity: Yes     Partners: Male     Birth control/protection: None     Comment: no birth control   Other Topics Concern    Not on file   Social History Narrative    Not on file     Social Drivers of Health     Financial Resource Strain: Not on file   Food Insecurity: Not on file   Transportation Needs: Not on file   Physical Activity: Not on file   Stress: Not on file   Social Connections: Not on file   Intimate Partner Violence: Not on file   Housing Stability: Not on file     Allergies: Bloodless, Kiwi extract, Pineapple, and Sulfa drugs  Current Medications[3]    Prenatal care with RW starting at 30weeks with following problems:  Patient Active Problem List    Diagnosis Date Noted    Prenatal care, subsequent pregnancy, third trimester 05/08/2025    Late prenatal care affecting pregnancy 05/08/2025    Elevated 1hr  --> 3hr GTT never completed 05/05/2025    Multigravida of advanced maternal age in third trimester 04/14/2025    ASCUS with positive high risk human papillomavirus of vagina 06/09/2023    Grand multiparity 03/03/2023       Last US at 35 weeks  EFW 40%, AC 64%, YAMILKA 15.0cm      Objective:      /62   Pulse 76   Temp 35.9 °C (96.6 °F) (Temporal)   Resp 18   Ht 1.448 m (4' 9\")   Wt 65.3 kg (144 lb)   SpO2 96%     General:   no acute distress   Skin:   normal   HEENT:  PERRLA   Lungs:   CTA bilateral   Heart:   S1, S2 normal, no murmur, click, rub or gallop, regular rate " and rhythm   Abdomen:   gravid, NT   EFW:  7#   Pelvis:  adequate   FHT:  125 BPM   Uterine Size: S=D   Presentations: Cephalic   Cervix: Per RN    Dilation: 4-5cm    Effacement: 70%    Station:  -2    Consistency: Soft    Position: Anterior     Lab Review  Lab:   Blood type: O     Recent Results (from the past 35 weeks)   POCT Urinalysis    Collection Time: 11/07/24  6:35 PM   Result Value Ref Range    POC Color OTHER Negative    POC Appearance CLOUDY Negative    POC Glucose NEGATIVE Negative mg/dL    POC Bilirubin SMALL Negative mg/dL    POC Ketones TRACE Negative mg/dL    POC Specific Gravity 1.030 <1.005 - >1.030    POC Blood MODERATE Negative    POC Urine PH 6.5 5.0 - 8.0    POC Protein 100 MG/DL Negative mg/dL    POC Urobiligen 1.0 Negative (0.2) mg/dL    POC Nitrites POSITIVE Negative    POC Leukocyte Esterase LARGE Negative   URINE CULTURE(NEW)    Collection Time: 11/07/24  6:46 PM    Specimen: Urine, Clean Catch   Result Value Ref Range    Significant Indicator POS (POS)     Source UR     Site URINE, CLEAN CATCH     Culture Result - (A)     Culture Result Escherichia coli  >100,000 cfu/mL   (A)        Susceptibility    Escherichia coli - JOSE     Ampicillin/sulbactam <=4/2 Sensitive mcg/mL     Ampicillin >16 Resistant mcg/mL     Amoxicillin/Clavulanic Acid <=8/4 Sensitive mcg/mL     Ceftriaxone <=1 Sensitive mcg/mL     Cefazolin* <=2 Sensitive mcg/mL      * Breakpoints when Cefazolin is used for therapy of infections  other than uncomplicated UTIs due to Enterobacterales are as  follows:  JOSE and Interpretation:  <=2 S  4 I  >=8 R       Ciprofloxacin <=0.25 Sensitive mcg/mL     Cefepime <=2 Sensitive mcg/mL     Cefuroxime <=4 Sensitive mcg/mL     Nitrofurantoin <=32 Sensitive mcg/mL     Gentamicin <=2 Sensitive mcg/mL     Levofloxacin <=0.5 Sensitive mcg/mL     Meropenem <=1 Sensitive mcg/mL     Meropenem/Vaborbactam <=2 Sensitive mcg/mL     Minocycline <=4 Sensitive mcg/mL     Pip/Tazobactam <=8 Sensitive  mcg/mL     Trimeth/Sulfa >2/38 Resistant mcg/mL     Tigecycline <=2 Sensitive mcg/mL     Tobramycin <=2 Sensitive mcg/mL   POCT Pregnancy    Collection Time: 11/07/24  6:50 PM   Result Value Ref Range    POC Urine Pregnancy Test Positive     Internal Control Positive Positive     Internal Control Negative Negative    POCT Urinalysis    Collection Time: 12/03/24  6:52 PM   Result Value Ref Range    POC Color yellow Negative    POC Appearance clear Negative    POC Glucose negative Negative mg/dL    POC Bilirubin negative Negative mg/dL    POC Ketones negative Negative mg/dL    POC Specific Gravity 1.030 <1.005 - >1.030    POC Blood trace-intact Negative    POC Urine PH 6.0 5.0 - 8.0    POC Protein 30 Negative mg/dL    POC Urobiligen 2.0 Negative (0.2) mg/dL    POC Nitrites negative Negative    POC Leukocyte Esterase small Negative   URINE CULTURE(NEW)    Collection Time: 12/03/24  6:53 PM    Specimen: Urine   Result Value Ref Range    Significant Indicator POS (POS)     Source UR     Site -     Culture Result - (A)     Culture Result Enterobacter cloacae complex  >100,000 cfu/mL   (A)        Susceptibility    Enterobacter cloacae complex - JOSE     Ampicillin/sulbactam >16/8 Resistant mcg/mL     Ampicillin >16 Resistant mcg/mL     Amoxicillin/Clavulanic Acid >16/8 Resistant mcg/mL     Ceftriaxone <=1 Sensitive mcg/mL     Cefazolin* >16 Resistant mcg/mL      * Breakpoints when Cefazolin is used for therapy of infections  other than uncomplicated UTIs due to Enterobacterales are as  follows:  JOSE and Interpretation:  <=2 S  4 I  >=8 R       Ciprofloxacin <=0.25 Sensitive mcg/mL     Cefepime <=2 Sensitive mcg/mL     Cefuroxime >16 Resistant mcg/mL     Nitrofurantoin 64 Intermediate mcg/mL     Gentamicin <=2 Sensitive mcg/mL     Levofloxacin <=0.5 Sensitive mcg/mL     Meropenem <=1 Sensitive mcg/mL     Meropenem/Vaborbactam <=2 Sensitive mcg/mL     Minocycline <=4 Sensitive mcg/mL     Pip/Tazobactam <=8 Sensitive mcg/mL      Trimeth/Sulfa <=0.5/9.5 Sensitive mcg/mL     Tigecycline <=2 Sensitive mcg/mL     Tobramycin <=2 Sensitive mcg/mL   COMPLETE CBC & AUTO DIFF WBC    Collection Time: 03/07/25  7:48 PM   Result Value Ref Range    Leukocytes, Absolute 11.3 (H) 3.7 - 10.6 x10E3/uL    RBC 4.08 (L) 4.19 - 5.21 x10e6/uL    Hemoglobin 12.9 12.3 - 16.0 g/dL    Hematocrit 37.5 36.0 - 47.0 %    MCV 91.8 81.0 - 99.0 fL    MCH 31.7 28.0 - 33.8 pg    MCHC 34.6 33.1 - 36.5 g/dL    RDW 13.5 11.8 - 14.0 %    Platelet Count 266 146 - 390 x10E3/uL    MPV 8 6.4 - 10.2 fL    Neutrophils-Polys 73.4 41.7 - 82.3 %    Lymphocytes 20.3 10.8 - 44.4 %    Monocytes 4.4 (L) 5.0 - 12.8 %    Eosinophils 0.9 0.0 - 6.6 %    Basophils 1 0.0 - 1.3 %    Neutrophils (Absolute) 8.3 (H) 1.40 - 8.00 x10E3/uL    Lymphs (Absolute) 2.3 1.00 - 5.20 x10E3/uL    Monos (Absolute) 0.5 0.16 - 1.00 x10E3/uL    Eos (Absolute) 0.1 0.00 - 0.80 x10E3/uL    Baso (Absolute) 0.1 0.00 - 0.30 x10E3/uL   Chlamydia/GC, PCR (Urine)    Collection Time: 04/14/25  9:33 AM    Specimen: Urine   Result Value Ref Range    C. trachomatis by PCR Negative Negative    Gc By Dna Probe Negative Negative    Source Urine    PREG CNTR PRENATAL PN    Collection Time: 04/14/25 10:09 AM   Result Value Ref Range    WBC 11.3 (H) 4.8 - 10.8 K/uL    RBC 3.93 (L) 4.20 - 5.40 M/uL    Hemoglobin 11.9 (L) 12.0 - 16.0 g/dL    Hematocrit 36.2 (L) 37.0 - 47.0 %    MCV 92.1 81.4 - 97.8 fL    MCH 30.3 27.0 - 33.0 pg    MCHC 32.9 32.2 - 35.5 g/dL    RDW 45.3 35.9 - 50.0 fL    Platelet Count 295 164 - 446 K/uL    MPV 10.9 9.0 - 12.9 fL    Hepatitis C Antibody Non-Reactive Non-Reactive    Hepatitis B Surface Antigen Non-Reactive Non-Reactive    Rubella IgG Antibody 77.00 IU/mL    Syphilis, Treponemal Qual Non-Reactive Non-Reactive   URINE CULTURE(NEW)    Collection Time: 04/14/25 10:09 AM    Specimen: Urine   Result Value Ref Range    Significant Indicator NEG     Source UR     Site -     Culture Result       Usual urogenital  arianne ,000 cfu/mL  3 or more organisms isolated, culture of doubtful  significance, please recollect.     HIV AG/AB COMBO ASSAY SCREENING    Collection Time: 25 10:09 AM   Result Value Ref Range    HIV Ag/Ab Combo Assay Non-Reactive Non Reactive   OP Prenatal Panel-Blood Bank    Collection Time: 25 10:09 AM   Result Value Ref Range    ABO Grouping Only O     Rh Grouping Only POS     Antibody Screen Scrn NEG    GLUCOSE 1HR GESTATIONAL    Collection Time: 25  9:48 AM   Result Value Ref Range    Glucose, Post Dose 159 (H) 70 - 139 mg/dL   GRP B STREP, BY PCR (GAGE BROTH)    Collection Time: 25  8:54 AM    Specimen: Genital   Result Value Ref Range    Strep Gp B DNA PCR Negative Negative   CBC WITHOUT DIFFERENTIAL    Collection Time: 25 11:53 AM   Result Value Ref Range    WBC 11.8 (H) 4.8 - 10.8 K/uL    RBC 3.96 (L) 4.20 - 5.40 M/uL    Hemoglobin 11.4 (L) 12.0 - 16.0 g/dL    Hematocrit 33.3 (L) 37.0 - 47.0 %    MCV 84.1 81.4 - 97.8 fL    MCH 28.8 27.0 - 33.0 pg    MCHC 34.2 32.2 - 35.5 g/dL    RDW 46.3 35.9 - 50.0 fL    Platelet Count 241 164 - 446 K/uL    MPV 10.5 9.0 - 12.9 fL   HOLD BLOOD BANK SPECIMEN (NOT TESTED)    Collection Time: 25 11:53 AM   Result Value Ref Range    Holding Tube - Bb DONE           Assessment:   Isabel Galarza at 40w2d  Labor status: Early latent labor.  Obstetrical history significant for   Patient Active Problem List    Diagnosis Date Noted    Prenatal care, subsequent pregnancy, third trimester 2025    Late prenatal care affecting pregnancy 2025    Elevated 1hr  --> 3hr GTT never completed 2025    Multigravida of advanced maternal age in third trimester 2025    ASCUS with positive high risk human papillomavirus of vagina 2023    Grand multiparity 2023   .      Plan:     #    #AMA    #Grandmultiparity  #Bloodless  -Have low threshold for treating for PPH, consider prophylactic medications at  time of delivery  -Bloodless order placed    #Late entry to PNC  -Dating based on 30 and 31 week scans by MFM  #Plan at this time is admit for labor and delivery. Consider pitocin, AROM for augmentation if appropriate.       JUAN RAMON Rollins MD Attending         [1]   Past Medical History:  Diagnosis Date    Allergy     Bell's palsy affecting pregnancy in third trimester 03/07/2025    Urinary tract infection    [2]   Past Surgical History:  Procedure Laterality Date    TUBE & ECTOPIC PREG., REMOVAL Left 01/01/2006    GYN SURGERY     [3] No current facility-administered medications for this encounter.

## 2025-06-27 NOTE — CARE PLAN
The patient is Stable - Low risk of patient condition declining or worsening    Shift Goals  Clinical Goals:   Patient Goals: Healthy mom, healthy baby  Family Goals: Support    Progress made toward(s) clinical / shift goals:  Progressing    Problem: Knowledge Deficit - L&D  Goal: Patient and family/caregivers will demonstrate understanding of plan of care, disease process/condition, diagnostic tests and medications  Outcome: Progressing  Note: Questions and concerns answered. Will continue to include pt in POC and educate as necessary.     Problem: Risk for Injury  Goal: Patient and fetus will be free of preventable injury/complications  Outcome: Progressing  Note: Continuous fetal and maternal monitoring.

## 2025-06-27 NOTE — ANESTHESIA PROCEDURE NOTES
Epidural Block    Date/Time: 6/27/2025 9:14 AM    Performed by: Eric Wallace M.D.  Authorized by: Eric Wallace M.D.    Patient Location:  OB  Start Time:  6/27/2025 9:14 AM  End Time:  6/27/2025 9:30 AM  Reason for Block: labor analgesia    patient identified, IV checked, site marked, risks and benefits discussed, surgical consent, monitors and equipment checked, pre-op evaluation and timeout performed    Patient Position:  Sitting  Prep: ChloraPrep, patient draped and sterile technique    Monitoring:  Blood pressure, continuous pulse oximetry and heart rate  Approach:  Midline  Location:  L3-L4  Injection Technique:  VENKAT air  Skin infiltration:  Lidocaine  Strength:  1%  Dose:  3ml  Needle Type:  Tuohy  Needle Gauge:  17 G  Needle Length:  3.5 in  Loss of resistance::  6  Catheter Size:  19 G  Catheter at Skin Depth:  11  Test Dose Result:  Negative   Dural puncture epidural. Epidural space accessed with tuohy needle at 6 cm then 25 gauge elsi was used to access intrathecal space with positive CSF flow. Epidural catheter then threaded through tuohy with no issues.

## 2025-06-27 NOTE — PROGRESS NOTES
0607: PT is  with EDC  making her 40&2 with contractions that started yesterday and have gotten more frequent and stronger. Pt denies LOF or VB. Pt reports normal FM. EFM/Aspinwall applied. Pt is bloodless.     SVE unchanged. Pt breathing through contraction.     0615: Call made to Merlyn DELATORRE with report. Provider to come to bedside.     0650: Report to Farrah RIDLEY. Pt moved to labor room.

## 2025-06-28 ENCOUNTER — PHARMACY VISIT (OUTPATIENT)
Dept: PHARMACY | Facility: MEDICAL CENTER | Age: 40
End: 2025-06-28
Payer: COMMERCIAL

## 2025-06-28 LAB
ERYTHROCYTE [DISTWIDTH] IN BLOOD BY AUTOMATED COUNT: 47.7 FL (ref 35.9–50)
HCT VFR BLD AUTO: 29.1 % (ref 37–47)
HGB BLD-MCNC: 9.8 G/DL (ref 12–16)
MCH RBC QN AUTO: 28.7 PG (ref 27–33)
MCHC RBC AUTO-ENTMCNC: 33.7 G/DL (ref 32.2–35.5)
MCV RBC AUTO: 85.1 FL (ref 81.4–97.8)
PLATELET # BLD AUTO: 201 K/UL (ref 164–446)
PMV BLD AUTO: 10.6 FL (ref 9–12.9)
RBC # BLD AUTO: 3.42 M/UL (ref 4.2–5.4)
WBC # BLD AUTO: 24.1 K/UL (ref 4.8–10.8)

## 2025-06-28 PROCEDURE — 700102 HCHG RX REV CODE 250 W/ 637 OVERRIDE(OP)

## 2025-06-28 PROCEDURE — 700105 HCHG RX REV CODE 258

## 2025-06-28 PROCEDURE — 770002 HCHG ROOM/CARE - OB PRIVATE (112)

## 2025-06-28 PROCEDURE — 700111 HCHG RX REV CODE 636 W/ 250 OVERRIDE (IP)

## 2025-06-28 PROCEDURE — 36415 COLL VENOUS BLD VENIPUNCTURE: CPT

## 2025-06-28 PROCEDURE — 700105 HCHG RX REV CODE 258: Performed by: STUDENT IN AN ORGANIZED HEALTH CARE EDUCATION/TRAINING PROGRAM

## 2025-06-28 PROCEDURE — 85027 COMPLETE CBC AUTOMATED: CPT

## 2025-06-28 PROCEDURE — RXMED WILLOW AMBULATORY MEDICATION CHARGE

## 2025-06-28 PROCEDURE — A9270 NON-COVERED ITEM OR SERVICE: HCPCS

## 2025-06-28 RX ORDER — ACETAMINOPHEN AND CODEINE PHOSPHATE 120; 12 MG/5ML; MG/5ML
1 SOLUTION ORAL DAILY
Qty: 84 TABLET | Refills: 3 | Status: SHIPPED | OUTPATIENT
Start: 2025-06-28

## 2025-06-28 RX ADMIN — AMPICILLIN SODIUM 2000 MG: 2 INJECTION, POWDER, FOR SOLUTION INTRAVENOUS at 12:17

## 2025-06-28 RX ADMIN — IBUPROFEN 800 MG: 800 TABLET, FILM COATED ORAL at 08:07

## 2025-06-28 RX ADMIN — ACETAMINOPHEN 1000 MG: 500 TABLET ORAL at 23:19

## 2025-06-28 RX ADMIN — SODIUM CHLORIDE: 9 INJECTION, SOLUTION INTRAVENOUS at 00:00

## 2025-06-28 RX ADMIN — AMPICILLIN SODIUM 2000 MG: 2 INJECTION, POWDER, FOR SOLUTION INTRAVENOUS at 00:01

## 2025-06-28 RX ADMIN — ACETAMINOPHEN 1000 MG: 500 TABLET ORAL at 14:09

## 2025-06-28 RX ADMIN — AMPICILLIN SODIUM 2000 MG: 2 INJECTION, POWDER, FOR SOLUTION INTRAVENOUS at 05:52

## 2025-06-28 SDOH — ECONOMIC STABILITY: TRANSPORTATION INSECURITY
IN THE PAST 12 MONTHS, HAS LACK OF RELIABLE TRANSPORTATION KEPT YOU FROM MEDICAL APPOINTMENTS, MEETINGS, WORK OR FROM GETTING THINGS NEEDED FOR DAILY LIVING?: NO

## 2025-06-28 SDOH — ECONOMIC STABILITY: TRANSPORTATION INSECURITY
IN THE PAST 12 MONTHS, HAS THE LACK OF TRANSPORTATION KEPT YOU FROM MEDICAL APPOINTMENTS OR FROM GETTING MEDICATIONS?: NO

## 2025-06-28 ASSESSMENT — SOCIAL DETERMINANTS OF HEALTH (SDOH)
WITHIN THE LAST YEAR, HAVE YOU BEEN AFRAID OF YOUR PARTNER OR EX-PARTNER?: NO
WITHIN THE LAST YEAR, HAVE YOU BEEN HUMILIATED OR EMOTIONALLY ABUSED IN OTHER WAYS BY YOUR PARTNER OR EX-PARTNER?: NO
IN THE PAST 12 MONTHS, HAS THE ELECTRIC, GAS, OIL, OR WATER COMPANY THREATENED TO SHUT OFF SERVICE IN YOUR HOME?: NO
WITHIN THE PAST 12 MONTHS, THE FOOD YOU BOUGHT JUST DIDN'T LAST AND YOU DIDN'T HAVE MONEY TO GET MORE: NEVER TRUE
WITHIN THE LAST YEAR, HAVE TO BEEN RAPED OR FORCED TO HAVE ANY KIND OF SEXUAL ACTIVITY BY YOUR PARTNER OR EX-PARTNER?: NO
WITHIN THE LAST YEAR, HAVE YOU BEEN KICKED, HIT, SLAPPED, OR OTHERWISE PHYSICALLY HURT BY YOUR PARTNER OR EX-PARTNER?: NO
WITHIN THE PAST 12 MONTHS, YOU WORRIED THAT YOUR FOOD WOULD RUN OUT BEFORE YOU GOT THE MONEY TO BUY MORE: NEVER TRUE

## 2025-06-28 ASSESSMENT — PAIN DESCRIPTION - PAIN TYPE
TYPE: ACUTE PAIN

## 2025-06-28 NOTE — CARE PLAN
The patient is Watcher - Medium risk of patient condition declining or worsening    Shift Goals  Clinical Goals: transfer to  and manage pain  Patient Goals: healthy mom and baby  Family Goals: support    Progress made toward(s) clinical / shift goals:  progressing    Problem: Risk for Injury  Goal: Patient and fetus will be free of preventable injury/complications  Outcome: Progressing  Note: Educated on use of call light and to ambulate carefully     Problem: Pain  Goal: Patient's pain will be alleviated or reduced to the patient’s comfort goal  Outcome: Progressing  Note: Educated on pain medication, educated on use of call light

## 2025-06-28 NOTE — CARE PLAN
The patient is Stable - Low risk of patient condition declining or worsening    Shift Goals  Clinical Goals: Pain control, Vitals signs WDL, Lochia WDL  Patient Goals:  Family Goals:     Progress made toward(s) clinical / shift goals:    Problem: Potential for postpartum infection related to presence of episiotomy/vaginal tear and/or uterine contamination  Goal: Patient will be absent from signs and symptoms of infection  Outcome: Progressing  Note: Patient free of signs and symptoms of infection through out this shift.     Problem: Altered physiologic condition related to immediate post-delivery state and potential for bleeding/hemorrhage  Goal: Patient physiologically stable as evidenced by normal lochia, palpable uterine involution and vital signs within normal limits  Outcome: Progressing  Note: Patient's fundus firm, one below umbilicus with scant bleeding through out this shift.

## 2025-06-28 NOTE — ANESTHESIA POSTPROCEDURE EVALUATION
Patient: Isabel Galarza    Procedure Summary       Date: 06/27/25 Room / Location:     Anesthesia Start: 0904 Anesthesia Stop: 1600    Procedure: Labor Epidural Diagnosis:     Scheduled Providers:  Responsible Provider: Eric Wallace M.D.    Anesthesia Type: epidural ASA Status: 2            Final Anesthesia Type: epidural  Last vitals  BP   Blood Pressure: (!) 150/60 (Pt arm bend while holding baby)    Temp   (!) 38.1 °C (100.6 °F)    Pulse   (!) 114   Resp   18    SpO2   99 %      Anesthesia Post Evaluation    Patient location during evaluation: floor  Patient participation: complete - patient participated  Level of consciousness: awake and alert    Airway patency: patent  Anesthetic complications: no  Cardiovascular status: hemodynamically stable  Respiratory status: acceptable  Hydration status: euvolemic    PONV: none          No notable events documented.     Nurse Pain Score: 0 (NPRS)

## 2025-06-28 NOTE — PROGRESS NOTES
Obstetrics & Gynecology Post-Delivery Progress Note    Date of Service  25    40 y.o.  s/p vaginal, spontaneous delivery  Delivery date: 2025         Subjective  Pt reports she is doing well. She has minimal bleeding. She has been afebrile since yesterday.  Pain: Yes,  controlled  Bleeding: lochia minimal  Tolerating PO: Yes  Voiding: without difficulty  Ambulating: yes  Passing flatus: Yes  Feeding: breast and bottle feeding      Objective  24hr VS:  Temp:  [36.3 °C (97.3 °F)-38.1 °C (100.6 °F)] 36.3 °C (97.3 °F)  Pulse:  [] 79  Resp:  [17-18] 17  BP: ()/(53-68) 108/62  SpO2:  [93 %-97 %] 97 %    Physical Exam  Gen: well and resting  CV: RRR, nl S1 and S2, no murmur  Resp: unlabored respirations, no intercostal retractions or accessory muscle use, clear to auscultation without rales or wheezes  Chest/Breasts: exam deferred  Abd: nontender, normal bowel sounds, soft  Fundus: firm, below umbilicus, and tender  Incision: not applicable, (vaginal delivery)  Perineum: deferred  Ext: symmetric and no edema, calves nontender    Labs:  Recent Results (from the past 48 hours)   CBC WITHOUT DIFFERENTIAL    Collection Time: 25 11:53 AM   Result Value Ref Range    WBC 11.8 (H) 4.8 - 10.8 K/uL    RBC 3.96 (L) 4.20 - 5.40 M/uL    Hemoglobin 11.4 (L) 12.0 - 16.0 g/dL    Hematocrit 33.3 (L) 37.0 - 47.0 %    MCV 84.1 81.4 - 97.8 fL    MCH 28.8 27.0 - 33.0 pg    MCHC 34.2 32.2 - 35.5 g/dL    RDW 46.3 35.9 - 50.0 fL    Platelet Count 241 164 - 446 K/uL    MPV 10.5 9.0 - 12.9 fL   HOLD BLOOD BANK SPECIMEN (NOT TESTED)    Collection Time: 25 11:53 AM   Result Value Ref Range    Holding Tube - Bb DONE    CBC with differential    Collection Time: 25  8:27 AM   Result Value Ref Range    WBC 14.4 (H) 4.8 - 10.8 K/uL    RBC 4.19 (L) 4.20 - 5.40 M/uL    Hemoglobin 11.8 (L) 12.0 - 16.0 g/dL    Hematocrit 35.7 (L) 37.0 - 47.0 %    MCV 85.2 81.4 - 97.8 fL    MCH 28.2 27.0 - 33.0 pg    MCHC 33.1  32.2 - 35.5 g/dL    RDW 46.5 35.9 - 50.0 fL    Platelet Count 250 164 - 446 K/uL    MPV 10.7 9.0 - 12.9 fL    Neutrophils-Polys 77.90 (H) 44.00 - 72.00 %    Lymphocytes 16.30 (L) 22.00 - 41.00 %    Monocytes 4.30 0.00 - 13.40 %    Eosinophils 0.70 0.00 - 6.90 %    Basophils 0.30 0.00 - 1.80 %    Immature Granulocytes 0.50 0.00 - 0.90 %    Nucleated RBC 0.00 0.00 - 0.20 /100 WBC    Neutrophils (Absolute) 11.21 (H) 1.82 - 7.42 K/uL    Lymphs (Absolute) 2.35 1.00 - 4.80 K/uL    Monos (Absolute) 0.62 0.00 - 0.85 K/uL    Eos (Absolute) 0.10 0.00 - 0.51 K/uL    Baso (Absolute) 0.05 0.00 - 0.12 K/uL    Immature Granulocytes (abs) 0.07 0.00 - 0.11 K/uL    NRBC (Absolute) 0.00 K/uL   T.PALLIDUM AB MAURY (Syphilis)    Collection Time: 25  8:27 AM   Result Value Ref Range    Syphilis, Treponemal Qual Non-Reactive Non-Reactive   CBC without differential- Once in AM regardless of delivery time    Collection Time: 25  5:36 AM   Result Value Ref Range    WBC 24.1 (H) 4.8 - 10.8 K/uL    RBC 3.42 (L) 4.20 - 5.40 M/uL    Hemoglobin 9.8 (L) 12.0 - 16.0 g/dL    Hematocrit 29.1 (L) 37.0 - 47.0 %    MCV 85.1 81.4 - 97.8 fL    MCH 28.7 27.0 - 33.0 pg    MCHC 33.7 32.2 - 35.5 g/dL    RDW 47.7 35.9 - 50.0 fL    Platelet Count 201 164 - 446 K/uL    MPV 10.6 9.0 - 12.9 fL       Medications  ampicillin, 2,000 mg, Intravenous, Q6HRS      PRN medications: methylergonovine, carboPROST, miSOPROStol, LR, docusate sodium, ibuprofen, acetaminophen, oxytocin, misoprostol, methylergonovine, carboPROST, simethicone      Assessment/Plan  Isabel Galarza is a 40 y.o.yo  postpartum day #1  s/p vaginal, spontaneous delivery    - Post care: meeting all goals  - Patient had one elevated temperature during delivery at 100.6. Due to concern for chorioamnionitis she was started on ampicillin and gentamicin.   - Pain: controlled   - Rh+, Rubella Immune  - Method of Feeding: plans to breastfeed, plans to bottle feed  - Method of  Contraception: desires birth control pills, prescription sent    VTE prophylaxis: patient ambulating    - Disposition: Anticipate discharge home on PPD 2 to finish abx course and monitor pt and baby for fevers.        Alison Ervin D.O.   PGY-1  [facility]

## 2025-06-28 NOTE — CARE PLAN
Problem: Pain - Standard  Goal: Alleviation of pain or a reduction in pain to the patient’s comfort goal  Outcome: Progressing     Problem: Potential for postpartum infection related to presence of episiotomy/vaginal tear and/or uterine contamination  Goal: Patient will be absent from signs and symptoms of infection  Outcome: Progressing     Problem: Altered physiologic condition related to immediate post-delivery state and potential for bleeding/hemorrhage  Goal: Patient physiologically stable as evidenced by normal lochia, palpable uterine involution and vital signs within normal limits  Outcome: Progressing     Problem: Potential knowledge deficit related to lack of understanding of self and  care  Goal: Patient will verbalize understanding of self and infant care  Outcome: Progressing  Goal: Patient will demonstrate ability to care for self and infant  Outcome: Progressing     The patient is Watcher - Medium risk of patient condition declining or worsening    Shift Goals  Clinical Goals: Pain control, lochia WDL  Patient Goals: Pain control, rest  Family Goals: Rest, support    Progress made toward(s) clinical / shift goals:  Pain well controlled with non-narcotic analgesia, pt is able to ambulate and care for self and infant without difficulty. Lochia remains light without clots expressed, performing tashia care and pad changes independently. No s/s infection noted on assessment, pt now afebrile, antibiotics administered as per MAR, Q4hr VS monitoring in place. Pt and SO responsive to infant cues, participating in infant cares and demonstrate understanding of education provided at this time.    Patient is not progressing towards the following goals: NA

## 2025-06-28 NOTE — DISCHARGE PLANNING
":    Consult received \" Pt is bloodless. She does not have a an Advance Directive or Durable Power of .\"    Met with MOB at the bedside to discuss consult. Explained Advance Directive and Durable Power of  and why it is recommend she have one. Advance Directive Packet provided. Mother denied any questions/ needs at this time.   "

## 2025-06-28 NOTE — PROGRESS NOTES
1945 - Pt arrived to room 340 from L&D via wheelchair transport. Transferred to bed without difficulty, steady gait. Bedside report received from L&D RN Farrah. Lochia light without clots expressed, fundus firm midline, postpartum bag pitocin infusing at 125ml/hr as per MAR. Pt and SO oriented to room and unit, pain management options, tashia care and pad changes, updated on plan of care and safety precautions. Questions answered and pt verbalizes understanding. Siderails up x2, bed level down, call light within reach, no further questions or concerns at this time.    2010 - Transfer orders verified, telephone order received from NIKOLAS ALBARRAN to DC routine POC glucose check order on pt.

## 2025-06-28 NOTE — PROGRESS NOTES
0800- Assessment completed. Fundus firm, lochia scant. Abd. incisions with mepilex silver dressing intact. Plan of care reviewed. Patient stated feeling pain, medication provided (see MAR). Call light within reach, bed at lowest position, and shows no signs of distress at this time.

## 2025-06-28 NOTE — ANESTHESIA TIME REPORT
Anesthesia Start and Stop Event Times       Date Time Event    6/27/2025 0901 Ready for Procedure     0904 Anesthesia Start     1600 Anesthesia Stop          Responsible Staff  06/27/25      Name Role Begin End    Eric Wallace M.D. Anesth 0904 1600          Overtime Reason:  no overtime (within assigned shift)    Comments:

## 2025-06-28 NOTE — PROGRESS NOTES
0652 report received from Farrah CARLIN RN  1940 pt transferred to  via wheelchair with baby in arms  1945 report given to Ivan RIDLEY

## 2025-06-29 ENCOUNTER — PHARMACY VISIT (OUTPATIENT)
Dept: PHARMACY | Facility: MEDICAL CENTER | Age: 40
End: 2025-06-29
Payer: COMMERCIAL

## 2025-06-29 VITALS
DIASTOLIC BLOOD PRESSURE: 65 MMHG | HEART RATE: 69 BPM | RESPIRATION RATE: 17 BRPM | BODY MASS INDEX: 31.07 KG/M2 | HEIGHT: 57 IN | OXYGEN SATURATION: 96 % | WEIGHT: 144 LBS | SYSTOLIC BLOOD PRESSURE: 101 MMHG | TEMPERATURE: 98 F

## 2025-06-29 PROCEDURE — 99999 PR NO CHARGE: CPT | Performed by: STUDENT IN AN ORGANIZED HEALTH CARE EDUCATION/TRAINING PROGRAM

## 2025-06-29 PROCEDURE — A9270 NON-COVERED ITEM OR SERVICE: HCPCS

## 2025-06-29 PROCEDURE — 700102 HCHG RX REV CODE 250 W/ 637 OVERRIDE(OP)

## 2025-06-29 PROCEDURE — RXMED WILLOW AMBULATORY MEDICATION CHARGE

## 2025-06-29 RX ORDER — IBUPROFEN 800 MG/1
800 TABLET, FILM COATED ORAL EVERY 8 HOURS PRN
Qty: 120 TABLET | Refills: 2 | Status: SHIPPED | OUTPATIENT
Start: 2025-06-29

## 2025-06-29 RX ORDER — SIMETHICONE 125 MG
125 TABLET,CHEWABLE ORAL 4 TIMES DAILY PRN
Qty: 120 TABLET | Refills: 0 | Status: SHIPPED | OUTPATIENT
Start: 2025-06-29

## 2025-06-29 RX ORDER — ACETAMINOPHEN 500 MG
1000 TABLET ORAL EVERY 6 HOURS PRN
Qty: 120 TABLET | Refills: 2 | Status: SHIPPED | OUTPATIENT
Start: 2025-06-29

## 2025-06-29 RX ORDER — ASCORBIC ACID 500 MG
500 TABLET ORAL
Qty: 90 TABLET | Refills: 0 | Status: SHIPPED | OUTPATIENT
Start: 2025-06-29

## 2025-06-29 RX ORDER — FERROUS SULFATE 325(65) MG
325 TABLET ORAL
Qty: 90 TABLET | Refills: 0 | Status: SHIPPED | OUTPATIENT
Start: 2025-06-29

## 2025-06-29 RX ORDER — PSEUDOEPHEDRINE HCL 30 MG
100 TABLET ORAL 2 TIMES DAILY PRN
Qty: 60 CAPSULE | Refills: 0 | Status: SHIPPED | OUTPATIENT
Start: 2025-06-29

## 2025-06-29 RX ADMIN — IBUPROFEN 800 MG: 800 TABLET, FILM COATED ORAL at 04:13

## 2025-06-29 ASSESSMENT — EDINBURGH POSTNATAL DEPRESSION SCALE (EPDS)
I HAVE LOOKED FORWARD WITH ENJOYMENT TO THINGS: AS MUCH AS I EVER DID
I HAVE BEEN SO UNHAPPY THAT I HAVE BEEN CRYING: NO, NEVER
I HAVE FELT SAD OR MISERABLE: NO, NOT AT ALL
THINGS HAVE BEEN GETTING ON TOP OF ME: NO, I HAVE BEEN COPING AS WELL AS EVER
I HAVE BEEN SO UNHAPPY THAT I HAVE HAD DIFFICULTY SLEEPING: NOT AT ALL
I HAVE BEEN ABLE TO LAUGH AND SEE THE FUNNY SIDE OF THINGS: AS MUCH AS I ALWAYS COULD
THE THOUGHT OF HARMING MYSELF HAS OCCURRED TO ME: NEVER
I HAVE BEEN ANXIOUS OR WORRIED FOR NO GOOD REASON: NO, NOT AT ALL
I HAVE BLAMED MYSELF UNNECESSARILY WHEN THINGS WENT WRONG: NO, NEVER
I HAVE FELT SCARED OR PANICKY FOR NO GOOD REASON: NO, NOT AT ALL

## 2025-06-29 ASSESSMENT — PAIN DESCRIPTION - PAIN TYPE
TYPE: ACUTE PAIN
TYPE: ACUTE PAIN

## 2025-06-29 NOTE — DISCHARGE PLANNING
Discharge Planning Assessment Postpartum     LMSW reviewed chart and met with parents of baby in postpartum to complete assessment.     Reason for Referral: Limited prenatal care     Name of Baby: Giovanna Nuñez  Baby Diagnosis:   YOB: 2024  Time of birth: 4:00 PM  Gestational Age: 40w2d   Sex: Female     Mother of Baby: Isabel Galarza  Mother Diagnosis: Vaginal delivery  Contact information: [phone]     Mother's Prenatal Care: [facility]. Reports limited prenatal care was due to transport and recently moving to [place].  PCP for Baby: Tierra Biggs     Father of Baby: Lewispa Anupam  Contact Information: [phone]  Involved in baby’s care? Yes     Address: [address] [place] [address]   Type of Living Situation: Stable     Support System: Yes  Coping/Bonding Between Parents and Baby: Yes     Source of Feeding: Plans to breastfeed  Supplies for Infant: Reports appropriate supplies including car seat, clothes, etc     Mom's Insurance: [place] Medicaid  Baby Covered on Insurance: Yes     Financial Hardship/Income: No     Other children in the home/names & ages: Mother reports she was a 23, 19, 18, 16, 15, 14, 8, and 2 year old.      Mom's Mental Status: Appropriate, oriented  Services used prior to admit: Medicaid     CPS History: denied  Psychiatric History: Denied  Domestic Violence History: Per chart review, no concerns  Drug/ETOH History: Denied     Resources Provided:  Postpartum depression and anxiety information  Postpartum depression and anxiety resources  Postpartum support groups flyer  Counseling/outpatient mental health list      Referrals Made:   None at this time      Clearance for Discharge: Baby and mother are cleared to discharge when medically ready.     Neisha Cuellar LMSW

## 2025-06-29 NOTE — PROGRESS NOTES
Patient discharged home with follow up instructions. Discharge information and education provided by this RN, all questions answered. PIV removed. Meds to beds delivered. All belongings returned. Patient escorted out with infant.

## 2025-06-29 NOTE — PROGRESS NOTES
1930 Pt doing well bonding with baby, Assessment done fundus firm with scant lochia, abdomen soft non distended, voiding without difficulty, Denies pain at this time, Needs attended.

## 2025-06-29 NOTE — CARE PLAN
Problem: Altered physiologic condition related to immediate post-delivery state and potential for bleeding/hemorrhage  Goal: Patient physiologically stable as evidenced by normal lochia, palpable uterine involution and vital signs within normal limits  Outcome: Progressing     Problem: Alteration in comfort related to episiotomy, vaginal repair and/or after birth pains  Goal: Patient verbalizes acceptable pain level  Outcome: Progressing   The patient is Stable - Low risk of patient condition declining or worsening    Shift Goals< pain controlled, rest  Clinical Goals: maintain normal lochia  Patient Goals: pain controlled, rest  Family Goals: support    Progress made toward(s) clinical / shift goals:  pt verbalized acceptable level of pain    Patient is not progressing towards the following goals:

## 2025-06-29 NOTE — DISCHARGE SUMMARY
[facility]  Obstetrics Discharge Summary    Date of Admission: 2025  Date of Discharge: 25    Admitting diagnosis:    1. Pregnancy at 40w2d  2. Advanced maternal age  3. Grand multiparity   4. Elevated 1 hour glucose with no 3 hour  5. History of vacuum assisted delivery     Discharge Diagnosis:   1. Status post vaginal, spontaneous.  2. Acute blood loss anemia  3. Fever during delivery   4. Same as above    Hospital Course:   Pt is 40 y.o. now  who presented on 2025 for contractions in early latent labor.   Labor  augmentation performed with pitocin and AROM.   Epidural anesthesia was utilized with good effect on pain.   Patient progressed to a vaginal delivery. Due to patient being bloodless and at increased risk of postpartum hemorrhage she was given prophylactic TXA 1000mg IV after delivery. No lacerations were noted.   During labor patient had a fever of 100.6. Due to concern for chorioamnionitis patient was started on ampicillin and gentamicin. She remained afebrile in the postpartum period.    Postpartum course was unremarkable and patient has met all postpartum milestones.  Patient had early ambulation, well managed pain, tolerance of diet, spontaneous voiding, and appropriate feeding of infant.   She has remained afebrile and blood pressure has been well controlled.   All maternal questions and concerns addressed.    Single female infant was delivered via vaginal delivery on 25 at 1600 with APGARs 8 and 9 at 1 and 5 minutes respectively.    ml    PHYSICAL EXAM:  Temp:  [36.3 °C (97.3 °F)-36.7 °C (98 °F)] 36.7 °C (98 °F)  Pulse:  [66-79] 69  Resp:  [17-18] 17  BP: (101-108)/(61-65) 101/65  SpO2:  [96 %-97 %] 96 %    GEN: well appearing, no apparent distress  CV: +S1S2, RRR, trace BLE edema  RESP: CTAB, breathing comfortably on RA  ABD: soft, non-tender, non-distended, +BS  Fundus: firm below level of umbilicus  Incision: not applicable, (vaginal  delivery)  Perineum: Deferred  Extremities: symmetric, calves nontender    HISTORY:  Problem List[1]   Past Medical History[2]  OB History    Para Term  AB Living   11 8 8  3 8   SAB IAB Ectopic Molar Multiple Live Births   2  1  0 8      # Outcome Date GA Lbr Julian/2nd Weight Sex Type Anes PTL Lv   11 Term 26 40w2d / 02:08 3.17 kg (6 lb 15.8 oz) F Vag-Spont EPI N DARIUS      Complications: Intraamniotic Infection   10 Term 24 39w1d  3.085 kg (6 lb 12.8 oz) F Vag-Spont EPI N DARIUS   9 Term 17 39w4d  3.17 kg (6 lb 15.8 oz) F Vag-Spont EPI  DARIUS      Birth Comments: Pt states no complications   8 2014     SAB         Birth Comments: Baby passed on its own.   7 2013 6w0d    SAB         Birth Comments: Baby passed on  it own   6 Term 11/23/10 40w0d   M Vag-Spont EPI N DARIUS      Birth Comments: Pt states no complications.   5 Term 08/10/09 39w1d 08:00 3.26 kg (7 lb 3 oz) F Vag-Vacuum None N DARIUS      Birth Comments: Pt states no complications.   4 Term 10/23/07 40w0d 08:00  M Vag-Spont None N DARIUS      Birth Comments: Pt states no complications.   3 Term 06 38w2d 10:00  F Vag-Spont None N DARIUS      Birth Comments: Pt states no complications   2 Ectopic 05 5w0d             Birth Comments: Pt had right tube removed per note   1 Term 04/10/02 40w0d 15:00  F Vag-Spont None N DARIUS      Birth Comments: Pt states no complications     Past Surgical History[3]  Allergies[4]   Current Facility-Administered Medications   Medication Dose    oxytocin (Pitocin) infusion (for post delivery)  125 mL/hr    methylergonovine (Methergine) injection 0.2 mg  0.2 mg    carboPROST (Hemabate) injection 250 mcg  250 mcg    miSOPROStol (Cytotec) tablet 800 mcg  800 mcg    lactated ringers infusion  2,000 mL    docusate sodium (Colace) capsule 100 mg  100 mg    ibuprofen (Motrin) tablet 800 mg  800 mg    acetaminophen (Tylenol) tablet 1,000 mg  1,000 mg    PRN oxytocin (PITOCIN) (20 Units/1000 mL) PRN for  excessive uterine bleeding - See Admin Instr  125-999 mL/hr    miSOPROStol (Cytotec) tablet 600 mcg  600 mcg    methylergonovine (Methergine) injection 0.2 mg  0.2 mg    carboPROST (Hemabate) injection 250 mcg  250 mcg    simethicone (Mylicon) chewable tablet 125 mg  125 mg    NS infusion       Recent Labs     06/26/25  1153 06/27/25  0827 06/28/25  0536   WBC 11.8* 14.4* 24.1*   RBC 3.96* 4.19* 3.42*   HEMOGLOBIN 11.4* 11.8* 9.8*   HEMATOCRIT 33.3* 35.7* 29.1*   MCV 84.1 85.2 85.1   MCH 28.8 28.2 28.7   MCHC 34.2 33.1 33.7   RDW 46.3 46.5 47.7   PLATELETCT 241 250 201   MPV 10.5 10.7 10.6       Discharge Meds:   Current Outpatient Medications   Medication Sig Dispense Refill    acetaminophen (TYLENOL) 500 MG Tab Take 2 Tablets by mouth every 6 hours as needed for Mild Pain or Moderate Pain. 120 Tablet 2    docusate sodium 100 MG Cap Take 100 mg by mouth 2 times a day as needed for Constipation. 60 Capsule 0    ibuprofen (MOTRIN) 800 MG Tab Take 1 Tablet by mouth every 8 hours as needed for Mild Pain or Moderate Pain. 120 Tablet 2    simethicone (MYLICON) 125 MG chewable tablet Chew 1 Tablet 4 times a day as needed for Flatulence. 120 Tablet 0    ferrous sulfate 325 (65 Fe) MG tablet Take 1 Tablet by mouth every 48 hours. 90 Tablet 0    ascorbic acid (VITAMIN C) 500 MG tablet Take 1 Tablet by mouth every 48 hours. 90 Tablet 0    norethindrone (MICRONOR) 0.35 MG tablet Take 1 Tablet by mouth every day. 84 Tablet 3           Activity/ Discharge Instructions:  Discharge to home  Exercise and Activities as tolerated  Pelvic Rest x 6 weeks  No heavy lifting x4 weeks  Call or come to ED for: heavy vaginal bleeding, fever >100.4, severe abdominal pain, severe headache, chest pain, shortness of breath, significant nausea or vomiting, incisional drainage, or other concerns.  Contraception: desires contraceptive pills, prescription has been sent    Diet:  As tolerated. Additional 400 kcal per day to maintain milk supply.  "Drink plenty of fluids daily.  Continue prenatal vitamins for six months or as long as breastfeeding.  Continue iron and vitamin C every other day for six months or until anemia improves.     Follow up:     [facility] in five weeks for vaginal delivery.      Alison Ervin DO  PGY-1 Family Medicine Resident  [facility]     I reviewed patient's clinical course and discussed the management with the resident. I reviewed the resident's note and agree with the documented findings and plan of care.    Additional attending comments:    Patient is PPD#2 s/p .  Her intrapartum course was complicated by chorioamnionitis, for which she received antibiotics. Her delivery was uncomplicated. She is hemodynamically stable, with reassuring H/H, meeting appropriate postpartum milestones. Requests discharge today. RTC in 5 weeks for routine postpartum check.    Josefina Leo MD MPH         [1]   Patient Active Problem List  Diagnosis    Grand multiparity    ASCUS with positive high risk human papillomavirus of vagina    Multigravida of advanced maternal age in third trimester    Elevated 1hr  --> 3hr GTT never completed    Prenatal care, subsequent pregnancy, third trimester    Late prenatal care affecting pregnancy   [2]   Past Medical History:  Diagnosis Date    Allergy     Bell's palsy affecting pregnancy in third trimester 2025    Urinary tract infection    [3]   Past Surgical History:  Procedure Laterality Date    TUBE & ECTOPIC PREG., REMOVAL Left 2006    GYN SURGERY     [4]   Allergies  Allergen Reactions    Bloodless      Will not accept any kind of blood product that is not her own even in the case of emergency    Kiwi Extract Swelling    Pineapple Swelling    Sulfa Drugs Hives     \"when I was in [employer]\"  \"I looked like a lobster\"     "

## 2025-06-29 NOTE — DISCHARGE INSTRUCTIONS
